# Patient Record
Sex: FEMALE | Race: WHITE | Employment: FULL TIME | URBAN - METROPOLITAN AREA
[De-identification: names, ages, dates, MRNs, and addresses within clinical notes are randomized per-mention and may not be internally consistent; named-entity substitution may affect disease eponyms.]

---

## 2018-04-16 PROBLEM — D22.5 COMPOUND NEVUS OF BACK: Status: ACTIVE | Noted: 2018-04-16

## 2018-04-16 PROBLEM — D22.4: Status: ACTIVE | Noted: 2018-04-16

## 2018-04-16 PROBLEM — D22.70 MULTIPLE BENIGN NEVI OF UPPER AND LOWER EXTREMITIES, AND TRUNK: Status: ACTIVE | Noted: 2018-04-16

## 2018-04-16 PROBLEM — L70.0 ACNE VULGARIS: Status: ACTIVE | Noted: 2018-04-16

## 2018-04-16 PROBLEM — L72.3 SEBACEOUS CYST: Status: ACTIVE | Noted: 2018-04-16

## 2018-04-16 PROBLEM — D22.60 MULTIPLE BENIGN NEVI OF UPPER AND LOWER EXTREMITIES, AND TRUNK: Status: ACTIVE | Noted: 2018-04-16

## 2018-04-16 PROBLEM — D22.5 MULTIPLE BENIGN NEVI OF UPPER AND LOWER EXTREMITIES, AND TRUNK: Status: ACTIVE | Noted: 2018-04-16

## 2018-04-16 PROBLEM — L91.8 CUTANEOUS SKIN TAGS: Status: ACTIVE | Noted: 2018-04-16

## 2019-01-28 ENCOUNTER — OFFICE VISIT (OUTPATIENT)
Dept: PRIMARY CARE CLINIC | Age: 29
End: 2019-01-28
Payer: COMMERCIAL

## 2019-01-28 VITALS
HEART RATE: 86 BPM | SYSTOLIC BLOOD PRESSURE: 116 MMHG | OXYGEN SATURATION: 98 % | BODY MASS INDEX: 49.1 KG/M2 | WEIGHT: 283.8 LBS | DIASTOLIC BLOOD PRESSURE: 72 MMHG

## 2019-01-28 DIAGNOSIS — M67.442 GANGLION CYST OF TENDON SHEATH OF LEFT HAND: ICD-10-CM

## 2019-01-28 DIAGNOSIS — M77.8 LEFT WRIST TENDONITIS: Primary | ICD-10-CM

## 2019-01-28 PROCEDURE — 99213 OFFICE O/P EST LOW 20 MIN: CPT | Performed by: FAMILY MEDICINE

## 2019-01-28 RX ORDER — PHENTERMINE HYDROCHLORIDE 37.5 MG/1
37.5 TABLET ORAL
Qty: 30 TABLET | Refills: 0 | Status: SHIPPED | OUTPATIENT
Start: 2019-01-28 | End: 2019-02-12 | Stop reason: SINTOL

## 2019-02-12 ENCOUNTER — TELEPHONE (OUTPATIENT)
Dept: PRIMARY CARE CLINIC | Age: 29
End: 2019-02-12

## 2019-02-19 ENCOUNTER — OFFICE VISIT (OUTPATIENT)
Dept: PRIMARY CARE CLINIC | Age: 29
End: 2019-02-19
Payer: COMMERCIAL

## 2019-02-19 VITALS
OXYGEN SATURATION: 99 % | DIASTOLIC BLOOD PRESSURE: 78 MMHG | TEMPERATURE: 98.3 F | WEIGHT: 281.4 LBS | HEART RATE: 83 BPM | BODY MASS INDEX: 48.68 KG/M2 | SYSTOLIC BLOOD PRESSURE: 126 MMHG

## 2019-02-19 DIAGNOSIS — J98.9 RESPIRATORY ILLNESS: Primary | ICD-10-CM

## 2019-02-19 DIAGNOSIS — R05.9 COUGH: ICD-10-CM

## 2019-02-19 PROCEDURE — 99213 OFFICE O/P EST LOW 20 MIN: CPT | Performed by: PHYSICIAN ASSISTANT

## 2019-02-19 PROCEDURE — 1036F TOBACCO NON-USER: CPT | Performed by: PHYSICIAN ASSISTANT

## 2019-02-19 PROCEDURE — G8484 FLU IMMUNIZE NO ADMIN: HCPCS | Performed by: PHYSICIAN ASSISTANT

## 2019-02-19 PROCEDURE — G8427 DOCREV CUR MEDS BY ELIG CLIN: HCPCS | Performed by: PHYSICIAN ASSISTANT

## 2019-02-19 PROCEDURE — G8417 CALC BMI ABV UP PARAM F/U: HCPCS | Performed by: PHYSICIAN ASSISTANT

## 2019-02-19 RX ORDER — BENZONATATE 200 MG/1
200 CAPSULE ORAL 3 TIMES DAILY PRN
Qty: 30 CAPSULE | Refills: 0 | Status: SHIPPED | OUTPATIENT
Start: 2019-02-19 | End: 2019-02-25 | Stop reason: SDUPTHER

## 2019-02-19 ASSESSMENT — ENCOUNTER SYMPTOMS
WHEEZING: 0
SHORTNESS OF BREATH: 0
CHEST TIGHTNESS: 0
COUGH: 1

## 2019-02-19 ASSESSMENT — PATIENT HEALTH QUESTIONNAIRE - PHQ9
2. FEELING DOWN, DEPRESSED OR HOPELESS: 0
SUM OF ALL RESPONSES TO PHQ QUESTIONS 1-9: 0
SUM OF ALL RESPONSES TO PHQ QUESTIONS 1-9: 0
SUM OF ALL RESPONSES TO PHQ9 QUESTIONS 1 & 2: 0
1. LITTLE INTEREST OR PLEASURE IN DOING THINGS: 0

## 2019-02-24 ASSESSMENT — ENCOUNTER SYMPTOMS
DIARRHEA: 0
VOMITING: 1
SORE THROAT: 1
RHINORRHEA: 0

## 2019-02-25 ENCOUNTER — TELEPHONE (OUTPATIENT)
Dept: PRIMARY CARE CLINIC | Age: 29
End: 2019-02-25

## 2019-02-25 ENCOUNTER — OFFICE VISIT (OUTPATIENT)
Dept: PRIMARY CARE CLINIC | Age: 29
End: 2019-02-25
Payer: COMMERCIAL

## 2019-02-25 VITALS
HEART RATE: 84 BPM | SYSTOLIC BLOOD PRESSURE: 116 MMHG | OXYGEN SATURATION: 98 % | BODY MASS INDEX: 47.89 KG/M2 | WEIGHT: 276.8 LBS | DIASTOLIC BLOOD PRESSURE: 78 MMHG

## 2019-02-25 DIAGNOSIS — J34.89 RHINORRHEA: Primary | ICD-10-CM

## 2019-02-25 DIAGNOSIS — R05.9 COUGH: ICD-10-CM

## 2019-02-25 DIAGNOSIS — J98.9 RESPIRATORY ILLNESS: ICD-10-CM

## 2019-02-25 PROCEDURE — G8484 FLU IMMUNIZE NO ADMIN: HCPCS | Performed by: FAMILY MEDICINE

## 2019-02-25 PROCEDURE — G8417 CALC BMI ABV UP PARAM F/U: HCPCS | Performed by: FAMILY MEDICINE

## 2019-02-25 PROCEDURE — 99213 OFFICE O/P EST LOW 20 MIN: CPT | Performed by: FAMILY MEDICINE

## 2019-02-25 PROCEDURE — G8427 DOCREV CUR MEDS BY ELIG CLIN: HCPCS | Performed by: FAMILY MEDICINE

## 2019-02-25 PROCEDURE — 1036F TOBACCO NON-USER: CPT | Performed by: FAMILY MEDICINE

## 2019-02-25 RX ORDER — BENZONATATE 200 MG/1
200 CAPSULE ORAL 3 TIMES DAILY PRN
Qty: 30 CAPSULE | Refills: 0 | Status: SHIPPED | OUTPATIENT
Start: 2019-02-25 | End: 2021-06-04

## 2019-02-25 RX ORDER — FLUTICASONE PROPIONATE 50 MCG
2 SPRAY, SUSPENSION (ML) NASAL DAILY
Qty: 2 BOTTLE | Refills: 1 | Status: SHIPPED | OUTPATIENT
Start: 2019-02-25 | End: 2019-02-25 | Stop reason: SDUPTHER

## 2019-02-25 RX ORDER — SODIUM SULFACETAMIDE 98 MG/G
AEROSOL, FOAM TOPICAL
Refills: 4 | COMMUNITY
Start: 2019-02-22 | End: 2019-02-25

## 2019-02-25 RX ORDER — FLUTICASONE PROPIONATE 50 MCG
2 SPRAY, SUSPENSION (ML) NASAL DAILY
Qty: 2 BOTTLE | Refills: 1 | Status: SHIPPED | OUTPATIENT
Start: 2019-02-25 | End: 2021-06-04

## 2019-02-25 ASSESSMENT — ENCOUNTER SYMPTOMS: COUGH: 1

## 2021-06-04 ENCOUNTER — HOSPITAL ENCOUNTER (EMERGENCY)
Age: 31
Discharge: HOME | End: 2021-06-04
Attending: EMERGENCY MEDICINE
Payer: COMMERCIAL

## 2021-06-04 VITALS
HEIGHT: 64 IN | TEMPERATURE: 98.5 F | SYSTOLIC BLOOD PRESSURE: 142 MMHG | OXYGEN SATURATION: 100 % | HEART RATE: 99 BPM | RESPIRATION RATE: 12 BRPM | WEIGHT: 293 LBS | DIASTOLIC BLOOD PRESSURE: 87 MMHG | BODY MASS INDEX: 50.02 KG/M2

## 2021-06-04 DIAGNOSIS — R51.9 ACUTE NONINTRACTABLE HEADACHE, UNSPECIFIED HEADACHE TYPE: Primary | ICD-10-CM

## 2021-06-04 DIAGNOSIS — M62.838 SPASM OF CERVICAL PARASPINOUS MUSCLE: ICD-10-CM

## 2021-06-04 LAB — HCG(URINE) PREGNANCY TEST: NEGATIVE

## 2021-06-04 PROCEDURE — 6360000002 HC RX W HCPCS: Performed by: PHYSICIAN ASSISTANT

## 2021-06-04 PROCEDURE — 99283 EMERGENCY DEPT VISIT LOW MDM: CPT

## 2021-06-04 PROCEDURE — 96372 THER/PROPH/DIAG INJ SC/IM: CPT

## 2021-06-04 PROCEDURE — 6370000000 HC RX 637 (ALT 250 FOR IP): Performed by: PHYSICIAN ASSISTANT

## 2021-06-04 PROCEDURE — 81025 URINE PREGNANCY TEST: CPT

## 2021-06-04 RX ORDER — CYCLOBENZAPRINE HCL 10 MG
10 TABLET ORAL 3 TIMES DAILY PRN
Qty: 21 TABLET | Refills: 0 | Status: SHIPPED | OUTPATIENT
Start: 2021-06-04 | End: 2021-06-14

## 2021-06-04 RX ORDER — KETOROLAC TROMETHAMINE 30 MG/ML
30 INJECTION, SOLUTION INTRAMUSCULAR; INTRAVENOUS ONCE
Status: COMPLETED | OUTPATIENT
Start: 2021-06-04 | End: 2021-06-04

## 2021-06-04 RX ORDER — ONDANSETRON 4 MG/1
4 TABLET, ORALLY DISINTEGRATING ORAL ONCE
Status: COMPLETED | OUTPATIENT
Start: 2021-06-04 | End: 2021-06-04

## 2021-06-04 RX ORDER — ORPHENADRINE CITRATE 30 MG/ML
60 INJECTION INTRAMUSCULAR; INTRAVENOUS ONCE
Status: COMPLETED | OUTPATIENT
Start: 2021-06-04 | End: 2021-06-04

## 2021-06-04 RX ORDER — PREDNISONE 20 MG/1
60 TABLET ORAL ONCE
Status: COMPLETED | OUTPATIENT
Start: 2021-06-04 | End: 2021-06-04

## 2021-06-04 RX ADMIN — ORPHENADRINE CITRATE 60 MG: 60 INJECTION INTRAMUSCULAR; INTRAVENOUS at 17:18

## 2021-06-04 RX ADMIN — ONDANSETRON 4 MG: 4 TABLET, ORALLY DISINTEGRATING ORAL at 17:12

## 2021-06-04 RX ADMIN — PREDNISONE 60 MG: 20 TABLET ORAL at 17:20

## 2021-06-04 RX ADMIN — KETOROLAC TROMETHAMINE 30 MG: 30 INJECTION, SOLUTION INTRAMUSCULAR; INTRAVENOUS at 17:17

## 2021-06-04 ASSESSMENT — PAIN SCALES - GENERAL
PAINLEVEL_OUTOF10: 4
PAINLEVEL_OUTOF10: 5
PAINLEVEL_OUTOF10: 5

## 2021-06-04 NOTE — ED NOTES
[employer] \"[employer]\" called, states she is o/c for \"Cecilia\" and will be in.      Carlos Vora RN  06/04/21 [id number]

## 2021-06-04 NOTE — ED PROVIDER NOTES
[address] [facility]  [address] [facility] [address] [facility] [address]  Phone: [phone]  Fax: [phone]        Pt Name: Ramonita Mi  MRN: [id number]  [place] 1990  Date of evaluation: 6/4/21    [address] [place] [address]       Chief Complaint   Patient presents with    Headache    Neck Pain       HISTORY OF PRESENT ILLNESS (Location/Symptom, Timing/Onset, Context/Setting, Quality, Duration, Modifying Factors, Severity)      Ramonita Mi is a 35 y.o. female with no pertinent PMH who presents to the ED via private auto with a headache. Patient reports that approximately 1 week ago she awoke with a \"crick in my neck\" on the right side that later developed into a generalized tightness feeling. She thought she had just pulled a muscle, however, the pain was not improving and the following day she developed a diffuse occipital headache that now radiates up over her scalp and into her forehead. She reports that the pain does wax and wane in severity, but never goes completely away. She states that she feels like her head and neck are \"tight all over. \" Denies thunderclap development. Notes that this is not the worst headache of her life as she had a really bad headache a week after the Covid vaccine was there for a day and then resolved and never returned. Denies any head trauma or injury to the head. She has exacerbation of the pain with flexing her head all the way down and raising her eyebrows. She has taken Tylenol, ibuprofen, aspirin, and drink caffeine with minimal improvement of her headache. Denies use of blood thinners. Denies history of malignancy. Denies personal or family history of aneurysm. Patient does mention that they recently got a new bed and pillows approximately a month ago. She does note that it is much softer than their old bed. Patient notes that she is a belly sleeper to and so feels like maybe she is sleeping in a fairly extended position, but is unsure what she does overnight. She also mentions that over the winter she gained about 30 pounds due to a job change and her  notes that she snores more frequently since then. She has never had a sleep evaluation. Denies fever, chills, recent illness, aura, vision changes, photophobia, phonophobia, cough, congestion, facial weakness, nausea, vomiting, diarrhea, abdominal pain, syncope, extremity weakness, numbness, paresthesias, difficulty speaking, or any other complaints at this time. PAST MEDICAL / SURGICAL / SOCIAL / FAMILY HISTORY     PMH:  has a past medical history of H/O chronic ear infection. Surgical History:  has a past surgical history that includes Tonsillectomy and adenoidectomy and Tympanostomy tube placement. Social History:  reports that she has never smoked. She has never used smokeless tobacco. She reports current alcohol use. She reports that she does not use drugs. Family History: She indicated that her mother is . She indicated that the status of her father is unknown. She indicated that the status of her maternal grandmother is unknown. She indicated that her maternal grandfather is . She indicated that the status of her paternal grandfather is unknown.   family history includes Cancer in her maternal grandfather, mother, and paternal grandfather; Diabetes in her maternal grandmother; Heart Disease in her father. Psychiatric History: None    Allergies: Latex and Adipex-p [phentermine hcl]    Home Medications:   Prior to Admission medications    Medication Sig Start Date End Date Taking?  Authorizing Provider   cyclobenzaprine (FLEXERIL) 10 MG tablet Take 1 tablet by mouth 3 times daily as needed for Muscle spasms 21 Yes Mary Kate Morales PA-C   Clindamycin Phosphate 1 % FOAM Apply topically to face chest and back once to twice daily 18  Yes Yair Schneider PA-C   etonogestrel (NEXPLANON) 68 MG implant 68 mg by Subdermal route once   Yes Historical Provider, MD   Multiple Vitamins-Minerals (THERAPEUTIC MULTIVITAMIN-MINERALS) tablet Take 1 tablet by mouth daily   Yes Historical Provider, MD       REVIEW OF SYSTEMS  (2-9 systems for level 4, 10 ormore for level 5)      Review of Systems    Constitutional: See HPI. Eyes: See HPI. HENT: See HPI. Respiratory: Denies shortness of breath. Cardiovascular: Denies chest pain. GI: See HPI. Musculoskeletal: Denies recent trauma. Skin: Denies new rashes or wounds. Neurologic:  See HPI. Heme: Denies bleeding disorders. All other systems negative except as marked. PHYSICAL EXAM  (up to 7 for level 4, 8 or more for level 5)      INITIAL VITALS:  height is 5' 4\" (1.626 m) and weight is 133.8 kg (295 lb). Her oral temperature is 98.5 °F (36.9 °C). Her blood pressure is 142/87 (abnormal) and her pulse is 99. Her respiration is 12 and oxygen saturation is 100%. Vital signs reviewed. Physical Exam    General:  Alert, cooperative, well-groomed, well-nourished, appears stated age, and is in no acute distress. Head:  Normocephalic, atraumatic, and without obvious abnormality. There is mild tenderness to palpation of the occiput extending parietally and to the frontal region. All diffuse and no localized tenderness. No overlying skin changes. Eyes:  Sclerae/conjunctivae clear without injection, pallor, or icterus. Corneas clear without opacities. PERRL EOM's intact. No nystagmus. ENT: Ears and nose are all without obvious masses lesion or deformity. Lips and buccal mucosa are pink and moist without lesions. Good dentition. Gingivae is pink and without lesions. Tongue and uvula midline. Symmetric elevation of soft palate upon phonation. No hoarseness or muffled voice. Oropharynx is clear, without erythema. Tonsils are symmetrical, without enlargement or erythema, bilaterally. No exudates or drainage. Neck: Supple and symmetrical. Trachea midline. No adenopathy. No jugular venous distention.   There is diffuse tenderness to palpation along the bilateral lateral cervical regions, right >> left, extending into the bilateral shoulders/trapezii. There is no midline tenderness of the neck. No overlying skin changes. There is full ROM of the neck without meningeal signs. Lungs:   No respiratory distress. Clear to auscultation bilaterally. No wheezes, rhonchi, or rales. Heart:  Regular rate. Regular rhythm. No murmurs, rubs, or gallops. Abdomen:   Normoactive bowel sounds. Soft, nontender, nondistended without guarding or rebound. No palpable masses. No CVA tenderness. Extremities: Warm and dry without erythema or edema. Skin: Soft, good turgor, and well-hydrated. No obvious rashes or lesions. Neurologic: GCS is 15 and no focal deficits are appreciated. Normal and symmetric muscle bulk and tone throughout. Strength is 5/5 throughout. Sensation intact to light touch throughout. Gait with normal base. Normal heel, toe and tandem gait. Good speed, accuracy and rhythm of KEEGAN, FTN, and HTS. No pronator drift. Normal Romberg test. DTRs 2+. CN II-XII grossly intact. Psychiatric: Normal mood and affect. Normal behavior. Coherent thought process. DIFFERENTIAL DIAGNOSIS / MDM     The patient presents to the Emergency Department with a benign-appearing gradual-onset headache as described above with associated neck \"tightness. \"  No meningeal signs. This is not the worst headache of her life. Vital signs are stable with slightly elevated heart rate and blood pressure. The neurologic examination is normal. My suspicion for serious pathology is low given a lack of significant risk factors and reassuring history and physical examination. Patient I had an extensive discussion regarding the risks and benefits of CT scan and she deferred at this time. Presentation is more suspicious for musculoskeletal/tension headache versus significant concerning etiologies, though these have not been ruled out I do lower suspicion for this.   Will medicate the patient after pregnancy is negative and reassess. PLAN (LABS / IMAGING / EKG):  Orders Placed This Encounter   Procedures    PREGNANCY, URINE       MEDICATIONS ORDERED:  Orders Placed This Encounter   Medications    ondansetron (ZOFRAN-ODT) disintegrating tablet 4 mg    orphenadrine (NORFLEX) injection 60 mg    ketorolac (TORADOL) injection 30 mg    predniSONE (DELTASONE) tablet 60 mg    cyclobenzaprine (FLEXERIL) 10 MG tablet     Sig: Take 1 tablet by mouth 3 times daily as needed for Muscle spasms     Dispense:  21 tablet     Refill:  0       Controlled Substances Monitoring:     DIAGNOSTIC RESULTS     LABS:  Results for orders placed or performed during the hospital encounter of 06/04/21   PREGNANCY, URINE   Result Value Ref Range    HCG(Urine) Pregnancy Test NEGATIVE NEGATIVE       EMERGENCY DEPARTMENT COURSE           Vitals:    Vitals:    06/04/21 1633   BP: (!) 142/87   Pulse: 99   Resp: 12   Temp: 98.5 °F (36.9 °C)   TempSrc: Oral   SpO2: 100%   Weight: 133.8 kg (295 lb)   Height: 5' 4\" (1.626 m)     -------------------------  BP: (!) 142/87, Temp: 98.5 °F (36.9 °C), Pulse: 99, Resp: 12      RE-EVALUATION:  On re-evaluation, the patient has improved following the above-stated treatment. Will Rx with home medications. The patient and I have discussed the diagnosis and risks, and we agree with discharging home to follow-up with their primary doctor. The patient appears stable for discharge and has been instructed to return immediately for new concerning symptoms, if the symptoms worsen in any way, or in 8-12 hours if not improved for re-evaluation. We have discussed the symptoms which are most concerning (e.g., significant worsening of the headache or the development of confusion, fever, vision changes, weakness, numbness, difficulty with speech or walking) that necessitate immediate return.      The patient understands that at this time there is no evidence for a more malignant underlying process, but the patient also understands that early in the process of an illness or injury, an emergency department workup can be falsely reassuring. Routine discharge counseling was given, and the patient understands that worsening, changing or persistent symptoms should prompt an immediate call or follow up with their primary physician or return to the emergency department. The importance of appropriate follow up was also discussed. I have reviewed the disposition diagnosis with the patient and or their family/guardian. I have answered their questions and given discharge instructions. They voiced understanding of these instructions and did not have any further questions or complaints. This patient was seen by the attending physician and they agreed with the assessment and plan. CONSULTS:  None    PROCEDURES:  None    FINAL IMPRESSION      1. Acute nonintractable headache, unspecified headache type    2. Spasm of cervical paraspinous muscle          DISPOSITION / PLAN     CONDITION ON DISPOSITION:   Good / Stable for discharge. PATIENT REFERRED TO:  King Cuba MD  [address]   [address] [facility] [address]  [phone]    Call in 3 days  To schedule an appointment for follow-up      DISCHARGE MEDICATIONS:  Discharge Medication List as of 6/4/2021  6:22 PM      START taking these medications    Details   cyclobenzaprine (FLEXERIL) 10 MG tablet Take 1 tablet by mouth 3 times daily as needed for Muscle spasms, Disp-21 tablet, R-0Normal             Dennys Tee PA-C   Emergency Medicine Physician Assistant    (Please note that portions of this note were completed with a voice recognition program.  Efforts were made to edit the dictations but occasionally words aremis-transcribed.)        Dennys Tee PA-C  06/06/21 2030

## 2021-06-04 NOTE — ED PROVIDER NOTES
was gradual onset. Headache was not thunderclap in nature, or worst of life. However, she does not typically get headaches. No fever or chills. No neck pain or stiffness. No personal or family history of aneurysms in the brain. No focal deficits on exam. Patient is improved after medications. She was offered a CT of the head for further evaluation, but declined. Patient does have a primary care provider, and can get into the office within the next few days.       (Please note that portions of this note were completed with a voice recognition program.  Efforts were made to edit the dictations but occasionally words are mis-transcribed.)    Ann Hernández MD  Attending Emergency Medicine Physician        Ann Hernández MD  06/04/21 0672

## 2021-06-14 ENCOUNTER — OFFICE VISIT (OUTPATIENT)
Dept: PRIMARY CARE CLINIC | Age: 31
End: 2021-06-14
Payer: COMMERCIAL

## 2021-06-14 VITALS
OXYGEN SATURATION: 97 % | HEART RATE: 103 BPM | DIASTOLIC BLOOD PRESSURE: 74 MMHG | SYSTOLIC BLOOD PRESSURE: 124 MMHG | TEMPERATURE: 98.2 F | HEIGHT: 64 IN | BODY MASS INDEX: 50.02 KG/M2 | WEIGHT: 293 LBS

## 2021-06-14 DIAGNOSIS — G44.209 ACUTE NON INTRACTABLE TENSION-TYPE HEADACHE: Primary | ICD-10-CM

## 2021-06-14 DIAGNOSIS — R42 VERTIGO: ICD-10-CM

## 2021-06-14 PROCEDURE — 1036F TOBACCO NON-USER: CPT | Performed by: FAMILY MEDICINE

## 2021-06-14 PROCEDURE — G8427 DOCREV CUR MEDS BY ELIG CLIN: HCPCS | Performed by: FAMILY MEDICINE

## 2021-06-14 PROCEDURE — G8417 CALC BMI ABV UP PARAM F/U: HCPCS | Performed by: FAMILY MEDICINE

## 2021-06-14 PROCEDURE — 99214 OFFICE O/P EST MOD 30 MIN: CPT | Performed by: FAMILY MEDICINE

## 2021-06-14 SDOH — ECONOMIC STABILITY: FOOD INSECURITY: WITHIN THE PAST 12 MONTHS, THE FOOD YOU BOUGHT JUST DIDN'T LAST AND YOU DIDN'T HAVE MONEY TO GET MORE.: NEVER TRUE

## 2021-06-14 SDOH — ECONOMIC STABILITY: FOOD INSECURITY: WITHIN THE PAST 12 MONTHS, YOU WORRIED THAT YOUR FOOD WOULD RUN OUT BEFORE YOU GOT MONEY TO BUY MORE.: NEVER TRUE

## 2021-06-14 ASSESSMENT — PATIENT HEALTH QUESTIONNAIRE - PHQ9
SUM OF ALL RESPONSES TO PHQ QUESTIONS 1-9: 0
SUM OF ALL RESPONSES TO PHQ QUESTIONS 1-9: 0
SUM OF ALL RESPONSES TO PHQ9 QUESTIONS 1 & 2: 0
SUM OF ALL RESPONSES TO PHQ QUESTIONS 1-9: 0
2. FEELING DOWN, DEPRESSED OR HOPELESS: 0
1. LITTLE INTEREST OR PLEASURE IN DOING THINGS: 0

## 2021-06-14 ASSESSMENT — SOCIAL DETERMINANTS OF HEALTH (SDOH): HOW HARD IS IT FOR YOU TO PAY FOR THE VERY BASICS LIKE FOOD, HOUSING, MEDICAL CARE, AND HEATING?: NOT VERY HARD

## 2021-06-14 NOTE — PROGRESS NOTES
[address] [place] [address] PRIMARY CARE  [address]  [facility] [address]  Dept: [phone]    Caitie Everett is a 35 y.o. female Established patient, who presents today for her medical conditions/complaintsas noted below. Chief Complaint   Patient presents with    Headache     ED vitist 6/4/21 tnesion        HPI:     HPI  Headaches better now. Stopped taking flexeril last week. Sleeping better. H/a started 2 weeks ago, right neck stiffness and pain behind the ear, got vertiog. Then got pressure around her head and into the eyes. Had severe pressure pain in the head and moving eyes felt worse. Usually only gets headaches after dehydration etc.     Got a headache a week after her first covid vaccine and dizziness and eyes hurt    No fam hx of significant migraines: 1/2 sister gets occasional migraines. Does computer work all day. Got new glasses last week.      Reviewed prior notes None  Reviewed previous Labs, Imaging and Hospital Records    LDL Calculated (mg/dL)   Date Value   03/15/2018 92       (goal LDL is <100)   No results found for: AST, ALT, BUN, LABA1C, TSH  BP Readings from Last 3 Encounters:   06/14/21 124/74   06/04/21 (!) 142/87   02/25/19 116/78          (goal 120/80)    Past Medical History:   Diagnosis Date    H/O chronic ear infection       Past Surgical History:   Procedure Laterality Date    TONSILLECTOMY AND ADENOIDECTOMY      TYMPANOSTOMY TUBE PLACEMENT      multiple times       Family History   Problem Relation Age of Onset    Cancer Mother         lung-smoker    Heart Disease Father     Diabetes Maternal Grandmother     Cancer Maternal Grandfather     Cancer Paternal Grandfather        Social History     Tobacco Use    Smoking status: Never Smoker    Smokeless tobacco: Never Used   Substance Use Topics    Alcohol use: Yes     Comment: rarely      Current Outpatient Medications   Medication Sig Dispense Refill    cyclobenzaprine (FLEXERIL) 10 MG tablet Take 1 tablet by mouth 3 times daily as needed for Muscle spasms 21 tablet 0    Clindamycin Phosphate 1 % FOAM Apply topically to face chest and back once to twice daily 100 g 2    etonogestrel (NEXPLANON) 68 MG implant 68 mg by Subdermal route once      Multiple Vitamins-Minerals (THERAPEUTIC MULTIVITAMIN-MINERALS) tablet Take 1 tablet by mouth daily       No current facility-administered medications for this visit. Allergies   Allergen Reactions    Latex Rash    Adipex-P [Phentermine Hcl] Palpitations     Dry mouth, palpitations, can't sleep       Health Maintenance   Topic Date Due    Hepatitis C screen  Never done    Varicella vaccine (1 of 2 - 2-dose childhood series) Never done    COVID-19 Vaccine (1) Never done    HIV screen  Never done    DTaP/Tdap/Td vaccine (1 - Tdap) Never done    Cervical cancer screen  11/30/2018    Flu vaccine (Season Ended) 09/01/2021    Hepatitis A vaccine  Aged Out    Hepatitis B vaccine  Aged Out    Hib vaccine  Aged Out    Meningococcal (ACWY) vaccine  Aged Out    Pneumococcal 0-64 years Vaccine  Aged Out       Subjective:      Review of Systems   Constitutional: Negative. Neurological: Positive for headaches.   had vertigo with the headache  Sees dermatologist.   Had multiple ear surgeries as a child. Gets dizziness with fast motion: that is better    Objective:     /74   Pulse 103   Temp 98.2 °F (36.8 °C)   Ht 5' 4\" (1.626 m)   Wt (!) 315 lb 9.6 oz (143.2 kg)   SpO2 97%   Breastfeeding No   BMI 54.17 kg/m²   Physical Exam  Vitals and nursing note reviewed. Constitutional:       General: She is not in acute distress. Appearance: She is well-developed. She is not ill-appearing. HENT:      Head: Normocephalic and atraumatic.       Right Ear: Ear canal and external ear normal.      Left Ear: Ear canal and external ear normal.      Ears:      Comments: TM's dull, pink slightly  Scar tissue on left  Eyes: General: No scleral icterus. Right eye: No discharge. Left eye: No discharge. Conjunctiva/sclera: Conjunctivae normal.      Pupils: Pupils are equal, round, and reactive to light. Neck:      Thyroid: No thyromegaly. Trachea: No tracheal deviation. Comments: Neck range of motion is within acceptable range. Her posterior scalp muscles are tight. Cervical paraspinals and upper trapezius muscles are tight but not tender. TMJs are not tender to palpation, no crepitations  Temporal arteries not swollen or tender. Cardiovascular:      Rate and Rhythm: Normal rate and regular rhythm. Heart sounds: Normal heart sounds. Pulmonary:      Effort: Pulmonary effort is normal. No respiratory distress. Breath sounds: Normal breath sounds. No wheezing. Lymphadenopathy:      Cervical: No cervical adenopathy. Skin:     General: Skin is warm. Findings: No rash. Neurological:      Mental Status: She is alert and oriented to person, place, and time. Deep Tendon Reflexes:      Reflex Scores:       Bicep reflexes are 0 on the right side and 1+ on the left side. Brachioradialis reflexes are 0 on the right side and 0 on the left side. Comments:  strength 5/5 bilaterally   Psychiatric:         Mood and Affect: Mood normal.         Behavior: Behavior normal.         Thought Content: Thought content normal.         Assessment:       Diagnosis Orders   1. Acute non intractable tension-type headache     2. Vertigo          Plan:      No follow-ups on file. Do stretches and if not better see PT  May need to see ENT for vertigo if it recurs  Neck and scapular exercises. Watch posture when working. No orders of the defined types were placed in this encounter. No orders of the defined types were placed in this encounter. Patient given educationalmaterials - see patient instructions. Discussed use, benefit, and side effectsof prescribed medications.   All patient questions answered. Pt voiced understanding. Reviewed health maintenance. Instructed to continue current medications, diet and exercise. Patient agreed with treatment plan. Follow up as directed.      Electronicallysigned by Consuelo Rinne, MD on 6/14/2021 at 2:32 PM

## 2021-06-14 NOTE — PATIENT INSTRUCTIONS
Patient Education        Neck Spasm: Exercises  Introduction  Here are some examples of exercises for you to try. The exercises may be suggested for a condition or for rehabilitation. Start each exercise slowly. Ease off the exercises if you start to have pain. You will be told when to start these exercises and which ones will work best for you. How to do the exercises  Levator scapula stretch   1. Sit in a firm chair, or stand up straight. 2. Gently tilt your head toward your left shoulder. 3. Turn your head to look down into your armpit, bending your head slightly forward. Let the weight of your head stretch your neck muscles. 4. Hold for 15 to 30 seconds. 5. Return to your starting position. 6. Follow the same instructions above, but tilt your head toward your right shoulder. 7. Repeat 2 to 4 times toward each shoulder. Upper trapezius stretch   1. Sit in a firm chair, or stand up straight. 2. This stretch works best if you keep your shoulder down as you lean away from it. To help you remember to do this, start by relaxing your shoulders and lightly holding on to your thighs or your chair. 3. Tilt your head toward your shoulder and hold for 15 to 30 seconds. Let the weight of your head stretch your muscles. 4. If you would like a little added stretch, place your arm behind your back. Use the arm opposite of the direction you are tilting your head. For example, if you are tilting your head to the left, place your right arm behind your back. 5. Repeat 2 to 4 times toward each shoulder. Neck rotation   1. Sit in a firm chair, or stand up straight. 2. Keeping your chin level, turn your head to the right, and hold for 15 to 30 seconds. 3. Turn your head to the left, and hold for 15 to 30 seconds. 4. Repeat 2 to 4 times to each side. Chin tuck   1. Lie on the floor with a rolled-up towel under your neck. Your head should be touching the floor.   2. Slowly bring your chin toward the front of your neck. 3. Hold for a count of 6, and then relax for up to 10 seconds. 4. Repeat 8 to 12 times. Forward neck flexion   1. Sit in a firm chair, or stand up straight. 2. Bend your head forward. 3. Hold for 15 to 30 seconds, then return to your starting position. 4. Repeat 2 to 4 times. Follow-up care is a key part of your treatment and safety. Be sure to make and go to all appointments, and call your doctor if you are having problems. It's also a good idea to know your test results and keep a list of the medicines you take. Where can you learn more? Go to https://Siverge NetworkspeBlue Interactive Group.Voya.ge. org and sign in to your [employer] account. Enter P962 in the Starbates box to learn more about \"Neck Spasm: Exercises. \"     If you do not have an account, please click on the \"Sign Up Now\" link. Current as of: November 16, 2020               Content Version: 12.8  © 2006-2021 [employer]. Care instructions adapted under license by [facility] ([facility]). If you have questions about a medical condition or this instruction, always ask your healthcare professional. [place] [address] any warranty or liability for your use of this information. Patient Education        Shoulder Blade: Exercises  Introduction  Here are some examples of exercises for you to try. The exercises may be suggested for a condition or for rehabilitation. Start each exercise slowly. Ease off the exercises if you start to have pain. You will be told when to start these exercises and which ones will work best for you. How to do the exercises  Shoulder roll   8. Stand tall with your chin slightly tucked. Imagine that a string at the top of your head is pulling you straight up. 9. Keep your arms relaxed. All motion will be in your shoulders. 10. Shrug your shoulders up toward your ears, then up and back.  [place] your shoulders down and back, like you're sliding your hands down into your back pants straight in front of you. Adjust your distance to create slight tension in the tubing or band. 4. Slightly tuck your chin. Relax your shoulders. 5. Without shrugging your shoulders, pull straight back. Your elbows will pass alongside your waist.    Pull-downs   1. Welch your elastic tubing or band in the top of a closed door. Take one end in each hand. 2. Either sit or stand, depending on what is more comfortable. If you feel unsteady, sit on a chair. 3. Start with your arms up and comfortably apart, elbows straight. There should be a slight tension in the tubing or band. 4. Slightly tuck your chin, and look straight ahead. 5. Keeping your back straight, slowly pull down and back, bending your elbows. 6. Stop where your hands are level with your chin, in a \"goalpost\" position. 7. Repeat 8 to 12 times. Chest T stretch   1. Lie on your back. Raise your knees so they are bent. Plant your feet on the floor, hip-width apart. 2. Tuck your chin, and relax your shoulders. 3. Reach your arms straight out to the sides. If you don't feel a mild stretch in your shoulders and across your chest, use a foam roll or a tightly rolled blanket under your spine, from your tailbone to your head. 4. Relax in this position for at least 15 to 30 seconds while you breathe normally. Repeat 2 to 4 times. 5. As you get used to this stretch, keep adding a little more time until you are able relax in this position for 2 or 3 minutes. When you can relax for at least 2 minutes, you only need to do the exercise 1 time per session. Chest goalpost stretch   1. Lie on your back. Raise your knees so they are bent. Plant your feet on the floor, hip-width apart. 2. Tuck your chin, and relax your shoulders. 3. Reach your arms straight out to the sides. 4. Bend your arms at the elbows, with your hands pointed toward the top of your head. Your arms should make an L on either side of your head. Your palms should be facing up.   5. If you don't feel a mild stretch in your shoulders and across your chest, use a foam roll or tightly rolled blanket under your spine, from your tailbone to your head. 6. Relax in this position for at least 15 to 30 seconds while you breathe normally. Repeat 2 to 4 times. 7. Each day you do this exercise, add a little more time until you can relax in this position for 2 or 3 minutes. When you can relax for at least 2 minutes, you only need to do the exercise 1 time per session. Follow-up care is a key part of your treatment and safety. Be sure to make and go to all appointments, and call your doctor if you are having problems. It's also a good idea to know your test results and keep a list of the medicines you take. Where can you learn more? Go to https://Think Silicondeepaeb.ConnectNigeria.com. org and sign in to your [employer] account. Enter [phone] in the Jetpac box to learn more about \"Shoulder Blade: Exercises. \"     If you do not have an account, please click on the \"Sign Up Now\" link. Current as of: November 16, 2020               Content Version: 12.8  © 8349-8288 Healthwise, Incorporated. Care instructions adapted under license by [facility] ([facility]). If you have questions about a medical condition or this instruction, always ask your healthcare professional. [address] any warranty or liability for your use of this information.

## 2022-05-24 ENCOUNTER — OFFICE VISIT (OUTPATIENT)
Dept: PRIMARY CARE CLINIC | Age: 32
End: 2022-05-24
Payer: COMMERCIAL

## 2022-05-24 VITALS
OXYGEN SATURATION: 98 % | WEIGHT: 293 LBS | HEIGHT: 64 IN | BODY MASS INDEX: 50.02 KG/M2 | SYSTOLIC BLOOD PRESSURE: 138 MMHG | HEART RATE: 105 BPM | DIASTOLIC BLOOD PRESSURE: 86 MMHG

## 2022-05-24 DIAGNOSIS — M65.4 DE QUERVAIN'S DISEASE (RADIAL STYLOID TENOSYNOVITIS): Primary | ICD-10-CM

## 2022-05-24 PROCEDURE — 99212 OFFICE O/P EST SF 10 MIN: CPT | Performed by: FAMILY MEDICINE

## 2022-05-24 PROCEDURE — 1036F TOBACCO NON-USER: CPT | Performed by: FAMILY MEDICINE

## 2022-05-24 PROCEDURE — G8417 CALC BMI ABV UP PARAM F/U: HCPCS | Performed by: FAMILY MEDICINE

## 2022-05-24 PROCEDURE — G8427 DOCREV CUR MEDS BY ELIG CLIN: HCPCS | Performed by: FAMILY MEDICINE

## 2022-05-24 RX ORDER — MINOCYCLINE HYDROCHLORIDE 100 MG/1
CAPSULE ORAL
COMMUNITY
Start: 2022-01-26

## 2022-05-24 RX ORDER — NAPROXEN 500 MG/1
500 TABLET ORAL 2 TIMES DAILY WITH MEALS
Qty: 60 TABLET | Refills: 0 | Status: SHIPPED | OUTPATIENT
Start: 2022-05-24 | End: 2022-06-20

## 2022-05-24 ASSESSMENT — PATIENT HEALTH QUESTIONNAIRE - PHQ9
SUM OF ALL RESPONSES TO PHQ QUESTIONS 1-9: 0
SUM OF ALL RESPONSES TO PHQ QUESTIONS 1-9: 0
SUM OF ALL RESPONSES TO PHQ9 QUESTIONS 1 & 2: 0
SUM OF ALL RESPONSES TO PHQ QUESTIONS 1-9: 0
2. FEELING DOWN, DEPRESSED OR HOPELESS: 0
SUM OF ALL RESPONSES TO PHQ QUESTIONS 1-9: 0
1. LITTLE INTEREST OR PLEASURE IN DOING THINGS: 0

## 2022-05-24 NOTE — PATIENT INSTRUCTIONS
Patient Education        Stevphen Beam Disease: Exercises  Introduction  Here are some examples of exercises for you to try. The exercises may be suggested for a condition or for rehabilitation. Start each exercise slowly. Ease off the exercises if you start to have pain. You will be told when to start these exercises and which ones will work bestfor you. How to do the exercises  Thumb lifts    1. Place your hand on a flat surface, with your palm up. 2. Lift your thumb away from your palm to make a \"C\" shape. 3. Hold for about 6 seconds. 4. Repeat 8 to 12 times. Passive thumb MP flexion    1. Hold your hand in front of you, and turn your hand so your little finger faces down and your thumb faces up. (Your hand should be in the position used for shaking someone's hand.) You may also rest your hand on a flat surface. 2. Use the fingers on your other hand to bend your thumb down at the point where your thumb connects to your palm. 3. Hold for at least 15 to 30 seconds. 4. Repeat 2 to 4 times. Finkelstein stretch    1. Hold your arms out in front of you. (Your hand should be in the position used for shaking someone's hand.)  2. Bend your thumb toward your palm. 3. Use your other hand to gently stretch your thumb and wrist downward until you feel the stretch on the thumb side of your wrist.  4. Hold for at least 15 to 30 seconds. 5. Repeat 2 to 4 times. Resisted ulnar deviation    For this exercise, you will need elastic exercise material, such as surgicaltubing or Thera-Band. 1. Sit leaning forward with your legs slightly spread and your elbow on your thigh. 2. Grasp one end of the band with your palm down, and step on the other end with the foot opposite the hand holding the band. 3. Slowly bend your wrist sideways and away from your knee. 4. Repeat 8 to 12 times. Follow-up care is a key part of your treatment and safety.  Be sure to make and go to all appointments, and call your doctor if you are having problems. It's also a good idea to know your test results and keep alist of the medicines you take. Where can you learn more? Go to https://chpepiceweb.TotSpot. org and sign in to your [employer] account. Enter O588 in the [facility] box to learn more about \"De Quervain's Disease: Exercises. \"     If you do not have an account, please click on the \"Sign Up Now\" link. Current as of: July 1, 2021               Content Version: 13.2  © 6426-7937 [employer]. Care instructions adapted under license by [facility] ([facility]). If you have questions about a medical condition or this instruction, always ask your healthcare professional. [address] any warranty or liability for your use of this information. Patient Education        Alessia Rojas Tenosynovitis: Care Instructions  Your Care Instructions  Alessia Rojas (say \"prk-nxum-QFP\") tenosynovitis is a problem that makes the bottom of your thumb and the side of your wrist hurt. When you have de Quervain's, the ropey fiber (tendon) that helps move your thumb away from yourfingers becomes swollen. You may have pain when you move your wrist or pick things up. You may hear acreaking sound when you move your wrist or thumb. Symptoms often get better in a few weeks with home care. Your doctor may want you to start some gentle stretching exercises once your symptoms are gone. Sometimes treatment with an injection or surgery is needed. Follow-up care is a key part of your treatment and safety. Be sure to make and go to all appointments, and call your doctor if you are having problems. It's also a good idea to know your test results and keep alist of the medicines you take. How can you care for yourself at home?  Until your symptoms are better, stop the activities that caused the pain.  Avoid moving the hand and wrist that hurt.    Follow your doctor's directions for wearing a splint to keep your thumb and wrist from moving.  Try ice or heat. ? Put ice or a cold pack on your thumb and wrist for 10 to 20 minutes at a time. Put a thin cloth between the ice and your skin. ? You can use heat for 20 to 30 minutes, 2 or 3 times a day. Try using a heating pad, hot shower, or hot pack.  Ask your doctor if you can take an over-the-counter pain medicine, such as acetaminophen (Tylenol), ibuprofen (Advil, Motrin), or naproxen (Aleve). Be safe with medicines. Read and follow all instructions on the label. When should you call for help? Watch closely for changes in your health, and be sure to contact your doctor if:     You have new or worse pain.      You have new or worse numbness or tingling in your hand or fingers.      Your hand feels weaker.      You do not get better as expected. Where can you learn more? Go to https://InsideMaps.Roojoom. org and sign in to your Interview Rocket account. Enter A194 in the [facility] box to learn more about \"De Quervain's Tenosynovitis: Care Instructions. \"     If you do not have an account, please click on the \"Sign Up Now\" link. Current as of: July 1, 2021               Content Version: 13.2  © 7573-5610 Healthwise, Incorporated. Care instructions adapted under license by [facility] ([facility]). If you have questions about a medical condition or this instruction, always ask your healthcare professional. [place] [address] any warranty or liability for your use of this information.

## 2022-05-24 NOTE — PROGRESS NOTES
Lynder Soulier is a 28 y.o. femalewho presents today for her medical conditions/complaints as noted below. Chief Complaint   Patient presents with    Wrist Pain     Pt here today with LT wrist pain, numbness and tingling. x1-2 months    Weight Gain     Pt wants to talk about her weight pain. HPI:     HPI  Tightness in left wrist started in Mid march and numbness in thumb. No injury. Seen at [facility] in April: given brace and prednisone  Can't lift with left hand, grabbing hurts. increase in pain feels tingling to touch, hurts in radial wrist and forearm. Left handed. Writing and typing a lot     Tried tylenol for pain. not much help      Current Outpatient Medications   Medication Sig Dispense Refill    minocycline (MINOCIN;DYNACIN) 100 MG capsule       naproxen (NAPROSYN) 500 MG tablet Take 1 tablet by mouth 2 times daily (with meals) 60 tablet 0    diclofenac sodium (VOLTAREN) 1 % GEL Apply 2 g topically 4 times daily 100 g 1    etonogestrel (NEXPLANON) 68 MG implant 68 mg by Subdermal route once      Multiple Vitamins-Minerals (THERAPEUTIC MULTIVITAMIN-MINERALS) tablet Take 1 tablet by mouth daily       No current facility-administered medications for this visit. Allergies   Allergen Reactions    Latex Rash    Adipex-P [Phentermine Hcl] Palpitations     Dry mouth, palpitations, can't sleep       Subjective:     Review of Systems   Constitutional: Negative. Objective:     /86   Pulse 105   Ht 5' 4\" (1.626 m)   Wt (!) 325 lb 9.6 oz (147.7 kg)   SpO2 98%   BMI 55.89 kg/m²   Physical Exam  Vitals and nursing note reviewed. Constitutional:       Appearance: Normal appearance. She is not ill-appearing. HENT:      Head: Normocephalic and atraumatic. Musculoskeletal:      Comments: Left radial wrist swollen and tender in the 1st MC/Carpal joint and radial wrist  Negative Tinel's sign. Negative wrist flick sign.   Not tender in the rest of the hand or the fingers except for the thumb and the radial wrist.  No redness in the joint. Mild increase in warmth. She feels decreased sensation over the posterior first metacarpal carpal area. Pain with stretching the left thumb across the palm. Mid forearm anterior and posterior surfaces are not tender to palpation. Pronation and supination of the forearm and hand do not cause increased pain. Resisted motion of the forearm in pronation, supination do not cause increasing pain      Skin:     General: Skin is warm. Neurological:      Mental Status: She is alert and oriented to person, place, and time. Psychiatric:         Mood and Affect: Mood normal.         Behavior: Behavior normal.         Thought Content: Thought content normal.         Assessment:       Diagnosis Orders   1. De Quervain's disease (radial styloid tenosynovitis)  [facility]  [place]    naproxen (NAPROSYN) 500 MG tablet    diclofenac sodium (VOLTAREN) 1 % GEL    XR WRIST LEFT (MIN 3 VIEWS)        Plan:      No follow-ups on file. Check xray since pain has been going for 2 months  Cold packs after work   Use wrist splint at HS and only off and on during the day.    Try voltaren gel  See PT/OT for wrist.     Orders Placed This Encounter   Procedures    XR WRIST LEFT (MIN 3 VIEWS)     Standing Status:   Future     Standing Expiration Date:   5/24/2023   [facility]  [place]     Referral Priority:   Routine     Referral Type:   Eval and Treat     Referral Reason:   Specialty Services Required     Requested Specialty:   Physical Therapist     Number of Visits Requested:   1     Orders Placed This Encounter   Medications    naproxen (NAPROSYN) 500 MG tablet     Sig: Take 1 tablet by mouth 2 times daily (with meals)     Dispense:  60 tablet     Refill:  0    diclofenac sodium (VOLTAREN) 1 % GEL     Sig: Apply 2 g topically 4 times daily     Dispense:  100 g     Refill:  1      Reviewed medications and possibleside effects.        Electronically signed by Judson Ardon MD on 5/24/2022 at 10:53 AM

## 2022-05-25 ENCOUNTER — HOSPITAL ENCOUNTER (OUTPATIENT)
Age: 32
Discharge: HOME | End: 2022-05-27
Payer: COMMERCIAL

## 2022-05-25 ENCOUNTER — HOSPITAL ENCOUNTER (OUTPATIENT)
Dept: GENERAL RADIOLOGY | Age: 32
Discharge: HOME | End: 2022-05-27
Payer: COMMERCIAL

## 2022-05-25 DIAGNOSIS — M65.4 DE QUERVAIN'S DISEASE (RADIAL STYLOID TENOSYNOVITIS): ICD-10-CM

## 2022-05-25 PROCEDURE — 73110 X-RAY EXAM OF WRIST: CPT

## 2022-06-01 ENCOUNTER — HOSPITAL ENCOUNTER (OUTPATIENT)
Dept: PHYSICAL THERAPY | Facility: CLINIC | Age: 32
Setting detail: THERAPIES SERIES
Discharge: HOME | End: 2022-06-01
Payer: COMMERCIAL

## 2022-06-01 PROCEDURE — 97162 PT EVAL MOD COMPLEX 30 MIN: CPT

## 2022-06-01 PROCEDURE — 97033 APP MDLTY 1+IONTPHRSIS EA 15: CPT

## 2022-06-01 PROCEDURE — 97110 THERAPEUTIC EXERCISES: CPT

## 2022-06-01 NOTE — FLOWSHEET NOTE
[facility] [address] [place] [address], [facility].  Phone: [phone]  Fax:     [phone]    Physical Therapy Evaluation    Date:  2022  Patient: Nolan Chinchilla  : 1990  MRN: [id number]  Physician: [address]: [facility]              Eligibility Status:  Eligible     DOS:   # of visits allowed/remainin  Source: Website  Spoke To:  [email or web address]  Reference: [id number]  Auth: NPRe  Fabiano yr; visit limit  remain; $15 copay; CVV$4361-$2170. 07remain; OPP$6350-$6255.53remain; No Coinsurance  Medical Diagnosis: L DeQuervain's Tenosynovitis     Rehab Codes: M65.4  Onset Date: 3/22                                  Subjective:  Pt report pain, stiffness of L radial wrist, thumb region, episodes of swelling, loss of mobility. Pt states pain began 3/22 w/ gradual onset and no specific incident of injury. Pain worse w/ gripping activities. Pt given thumb spica splint, Prednisone w/ short term relief. Pt w/ previous hx of L wrist strain.     PMHx: [] Unremarkable [] Diabetes [] HTN  [] Pacemaker   [] MI/Heart Problems [] Cancer [] Arthritis [] Asthma                         [x] refer to full medical chart  In [facility]  [] Other:        Tests: [x] X-Ray: neg [] MRI:  [] Other:    Medications: [x] Refer to full medical record [] None [] Other:  Allergies:      [x] Refer to full medical record [] None [] Other:    Function:  Hand Dominance  [] Right  [x] Left  Working:  [x] Normal Duty  [] Light Duty  [] Off D/T Condition  [] Retired     [] Not Employed  []  Disability  [] Other:            Return to work:   Job/ADL Description: Pt employed as  at [employer], mostly computer, desk work    Pain:  [x] Yes  [] No Pain Rating: (0-10 scale) 4/10  Pain altered Tx:  [] Yes  [x] No  Action:    Symptoms:  [] Improving [] Worsening [x] Same    Objective:   L/R   ROM  ° A/P STRENGTH    Elbow Flex WNL WNL 4 4+    Ext WNL WNL 4 4+    Pron WNL WNL 4- 4+    Sup 80 WNL 4- 4+    Wrist flex 80 WNL 4 4+    ext 70 WNL 4 4+    Thumb opp 20 WNL 4- 4+    ext 30 WNL 4- 4+    abd  WNL WNL 4- 4+    add WNL WNL 4- 4+      OBSERVATION No Deficit Deficit Not Tested Comments   Palpation [] [x]     Sensation [x] []     Edema [x] []       FUNCTION Normal Difficult Unable   Gripping [] [x] []   Lifting [] [x] []   Twisting [] [x] []     ASSESSMENT   Pt limited by L radial wrist, thumb pain, ROM loss, thumb, wrist, elbow weakness w/ limitations in functional gripping activties d/t DeQuervain's Tenosynovitis. Will initiate wrist, thumb ROM, stretching w/ gradual progression into strengthening exercises as symptoms improve. PROBLEMS  Wrist, thumb pain  Wrist, thumb ROM loss  Wrist, thumb weakness  Wrist, thumb functional deficits    SHORT TERM GOALS ( 8 visits)  Wrist, thumb pain = 0  Wrist, thumb ROM = WNL  Wrist, thumbt strength = 4+/5  Wrist, thumb function: , lift, twist w/o pain    LONG TERM GOALS ( 12 visits)  Independent Home Exercise program  Return to normal activity    PATIENT GOAL  Full motion, no pain    Rehab Potential:  [x] Good  [] Fair  [] Poor   Suggested Professional Referral:  [x] No  [] Yes:  Barriers to Goal Achievement[de-identified]  [x] No  [] Yes:  Domestic Concerns:  [x] No  [] Yes:    Pt. Education:  [x] Plans/Goals, Risks/Benefits discussed  [x] Home exercise program  Method of Education: [x] Verbal  [x] Demo  [x] Written  Comprehension of Education:  [x] Verbalizes understanding. [x] Demonstrates understanding. [] Needs Review. [] Demonstrates/verbalizes understanding of HEP/Ed previously given.     Treatment Plan:  [x] Therapeutic Exercise   26937  [x] Iontophoresis: 4 mg/mL Dexamethasone Sodium Phosphate  mAmin  63725   [] Therapeutic Activity  61830 [] Vasopneumatic cold with compression  10072    [] Gait Training   82343 [] Ultrasound   72549   [] Neuromuscular Re-education  46623 [] Electrical Stimulation Unattended  15236   [x] Manual Therapy  41196 [] Electrical Stimulation Attended  [id number]   [x] Instruction in HEP  [] Lumbar/Cervical Traction  [id number]   [] Aquatic Therapy   [id number] [] Cold/hotpack    [] Massage   16732      [] Dry Needling, 1 or 2 muscles  45019   [] Biofeedback, first 15 minutes   03651  [] Biofeedback, additional 15 minutes   54487 [] Dry Needling, 3 or more muscles  05131     [x]  Medication allergies reviewed for use of    Dexamethasone Sodium Phosphate 4mg/ml     with iontophoresis treatments. Pt is not allergic. Frequency:  2 x/week for 12 visits    Todays Treatment:     6/1/2022 Visit #1    Exercise Reps/ Time Weight/ Level Comments   Radial wrist, thumb stretch 10x10s     Stretch wrist flex, ext 10x10s     Thumb ext stretch 10x10s     Thumb abd stretch 10x10s     Thumb opp stretch 10x10s         Specific Instructions for next treatment:    Treatment Charges: Mins Units   [x] Evaluation ----- 1   [x]  Modalities: IP, cold sleeve 15 1   [x]  Ther Exercise 20 1   []  Manual Therapy     []  Ther Activities     []  Aquatics     []  Other       Evaluation Complexity:  History   (Personal factors, co-morbidities)  [] 0 [x] 1-2 [] 3+   Exam   (limitations, restrictions) [] 1-2 [x] 3 [] 4+   Clinical presentation (progression) [] Stable [x] Evolving [] Unstable   Decision Making [] Low [x] Moderate [] High    [] Low Complexity [x] Moderate Complexity [] High Complexity     Time in: 1500     Time out: 1600    Electronically signed by: Dia Washington PT        Physician Signature:________________________________Date:__________________  By signing above, I have reviewed this plan of care and certify a need for medically   necessary rehabilitation services.      *PLEASE SIGN ABOVE AND FAX BACK ALL PAGES*

## 2022-06-08 ENCOUNTER — HOSPITAL ENCOUNTER (OUTPATIENT)
Dept: PHYSICAL THERAPY | Facility: CLINIC | Age: 32
Setting detail: THERAPIES SERIES
Discharge: HOME | End: 2022-06-08
Payer: COMMERCIAL

## 2022-06-08 PROCEDURE — 97110 THERAPEUTIC EXERCISES: CPT

## 2022-06-08 PROCEDURE — 97033 APP MDLTY 1+IONTPHRSIS EA 15: CPT

## 2022-06-08 NOTE — FLOWSHEET NOTE
[] [facility]) [facility] CENTER &  Therapy  [address]  [phone]  F: [phone] [] [address] [facility]   [address]  P: [phone]  F: [phone] [x] [facility] &  Therapy  [address]  P: [phone]  F: [phone] [] [address]  P: [phone]  F: [phone] [] [address]  [facility]   [address]   [place]: [phone]  F: [phone]      Physical Therapy Daily Treatment Note    Date:  2022  Patient Name:  Luis Manuel Shah    :  1990  MRN: [id number]  Physician: [address] [place] [address] 21: [facility]              Eligibility Status: [facility]  DOS:   # of visits allowed/remainin  Source: Website  Spoke To:  Rayku  Reference: [id number]  Auth: NPRe  Fabiano yr; visit limit  remain; $15 copay; HER$4604-$7743. 07remain; OPP$6350-$6255.53remain; No Coinsurance  Medical Diagnosis: L DeQuervain's Tenosynovitis                Rehab Codes: M65.4  Onset Date: 3/22   Visit# / total visits:      Cancels/No Shows: 0/0    Subjective: Pt states that her L wrist and thumb have been feeling much better since her initial visit. Has been stretching daily. Purchases the freeze sleeve that has been helping as well. Any flexion or opposition stretch can be a little uncomfortable. Pain:  [x] Yes  [] No Location: L wrist/hand  Pain Rating: (0-10 scale) 1/10  Pain altered Tx:  [x] No  [] Yes  Action:  Comments:    Objective:       Todays Treatment:  Treatment Location  Left      Right                          Position    [x]          []  [] Supine    [] Prone   [] Side lying  [] Sitting          Treatment Modality    Hot Pack:    [] Large   [] Medium    [] Cervical _____ min   2 Cold Pack   [x] Large   [] Medium    [] Cervical     ___15__ min - freeze sleeve    Vasocompression    __° temp    ____pressure     _____ min    Electrical Stim:    [] IFC     [] MFAC      [] HVPC                          Protocol:_______  _____X_____min                          #Channels:  [] 1        [] 2       [] Other:    Ultrasound: ______W/cm2 x  ______min              [] 1MHz   [] 3Mhz                      Duty Factor: [] 100%  [] 50%   [] 20%                  Add: [] Lidex   [] Stim    [] Gel pad       Massage:    [] Soft Tissue   [] Cross Friction                      [] Ice ____min   2 Iontophoresis:  IOGEL:  [] 1.5ml   [] 2.5ml   [] 3.5ml                                            [] 1.0ml   [] 1.3ml                 Dosage:  [] 24 mA/min   [] 40 mA/min  []  80 mA/min                Channels:   [] 1    [] 2                [x] 4mg/ml Dexamethasone Sodium Phosphate Dosage 24-80 mAmin                [] Acetic Acid       [] Other     [x] Drug allergies reviewed    ___GF____Initials           ___6/01____Date    Traction:   [] Prone   [] Supine   X _______min               [] Lumbar x ____lbs      [] Cervical x _____lbs               [] Continuous     [] Intermittent  -   On:_____x Off:_____   1 Other: ex, man              6/8/2022 Visit #2     Exercise Reps/ Time Weight/ Level Comments               Radial wrist, thumb stretch 10x10s       Stretch wrist flex, ext 10x10s       Thumb ext stretch 10x10s       Thumb abd stretch 10x10s       Thumb opp stretch 10x10s       Opposition 10x      Wrist ROM 20x ea   RD most painful   Light towel squeeze 20x      manual: STR 1st CMC joint area      Specific Instructions for next treatment:        Treatment Charges: Mins Units   [x]  Modalities: CP 15 15   [x]  Ther Exercise 23 2   [x]  Manual Therapy 5 nc   []  Ther Activities     []  Aquatics     []  Vasocompression     [x]  Other: IP 15 1   Total Treatment time 43 3       Assessment: [x] Progressing toward goals. Reviewed and completed previous stretches, cueing for appropriate hold times for maximum benefit. Minor cueing needed for recall. Opposition movements aggravated symptoms but only very minor, more of a feeling of stiffness in the thumb. Instructed pt in very light towel  ex as well as wrist ROM ex, good tolerance. Only tender right at 1st [facility] joint. Ended with IP and CP as previous to address pain symptoms. Will progress pt as tierra. [] No change. [] Other:  [x] Patient would continue to benefit from skilled physical therapy services in order to: restore ROM and progress L hand/wrist strength for improved function. SHORT TERM GOALS ( 8 visits)  Wrist, thumb pain = 0  Wrist, thumb ROM = WNL  Wrist, thumbt strength = 4+/5  Wrist, thumb function: , lift, twist w/o pain     LONG TERM GOALS ( 12 visits)  Independent Home Exercise program  Return to normal activity     PATIENT GOAL  Full motion, no pain    Pt. Education:  [x] Yes  [] No  [x] Reviewed Prior HEP/Ed  Method of Education: [x] Verbal  [] Demo  [] Written  Comprehension of Education:  [x] Verbalizes understanding. [x] Demonstrates understanding. [] Needs review. [] Demonstrates/verbalizes HEP/Ed previously given. Plan: [x] Continue current frequency toward long and short term goals.     [x] Specific Instructions for subsequent treatments: cont per POC      Time In: 5:03 pm           Time Out: 5:48 pm    Electronically signed by:  Rita Carpenter PTA

## 2022-06-10 ENCOUNTER — HOSPITAL ENCOUNTER (OUTPATIENT)
Dept: PHYSICAL THERAPY | Facility: CLINIC | Age: 32
Setting detail: THERAPIES SERIES
Discharge: HOME | End: 2022-06-10
Payer: COMMERCIAL

## 2022-06-10 PROCEDURE — 97033 APP MDLTY 1+IONTPHRSIS EA 15: CPT

## 2022-06-10 PROCEDURE — 97110 THERAPEUTIC EXERCISES: CPT

## 2022-06-10 NOTE — FLOWSHEET NOTE
[] [facility]) [facility] &  Therapy  [address]  [phone]  F: [phone] [] [address]  [address] [facility] 36   [address]  P: [phone]  F: [phone] [x] [place] &  Therapy  [address]  P: [phone]  F: [phone] [] [address]  P: [phone]  F: [phone] [] [address]  [facility]   [address]   [place]: [phone]  F: [phone]      Physical Therapy Daily Treatment Note    Date:  6/10/2022  Patient Name:  Manuel Postal    :  1990  MRN: [id number]  Physician: [address] [place] [address] 21: [facility]              Eligibility Status: Raven Simpson  DOS:   # of visits allowed/remainin  Source: Website  Spoke To:  [employer]  Reference: [id number]  Auth: NPRe  Fabiano yr; visit limit  remain; $15 copay; ERF$8140-$8465. 07remain; OPP$6350-$6255.53remain; No Coinsurance  Medical Diagnosis: L DeQuervain's Tenosynovitis                Rehab Codes: M65.4  Onset Date: 3/22   Visit# / total visits: 3/12     Cancels/No Shows: 0/0    Subjective:      Pain:  [x] Yes  [] No Location: L wrist/hand  Pain Rating: (0-10 scale) 0/10  Pain altered Tx:  [x] No  [] Yes  Action:  Comments: Pt notes no pain upon arrival, cont to have some difficulty with tossing motions. Has been doing HEP in the am and ice sleeve in the evenings. Lifting while cleaning the litter box and lifting cast iron pan. Objective:       Todays Treatment:  Treatment Location  Left      Right                          Position    [x]          []  [] Supine    [] Prone   [] Side lying  [] Sitting          Treatment Modality    Hot Pack:    [] Large   [] Medium    [] Cervical     _____ min   2 Cold Pack   [x] Large   [] Medium    [] Cervical     ___15__ min - freeze sleeve    Vasocompression    __° temp    ____pressure     _____ min    Electrical Stim:    [] IFC     [] MFAC      [] HVPC                          Protocol:_______  _____X_____min                          #Channels:  [] 1        [] 2       [] Other:    Ultrasound: ______W/cm2 x  ______min              [] 1MHz   [] 3Mhz                      Duty Factor: [] 100%  [] 50%   [] 20%                  Add: [] Lidex   [] Stim    [] Gel pad       Massage:    [] Soft Tissue   [] Cross Friction                      [] Ice ____min   2 Iontophoresis:  IOGEL:  [] 1.5ml   [] 2.5ml   [] 3.5ml                                            [] 1.0ml   [] 1.3ml                 Dosage:  [] 24 mA/min   [] 40 mA/min  []  80 mA/min                Channels:   [] 1    [] 2                [x] 4mg/ml Dexamethasone Sodium Phosphate Dosage 24-80 mAmin                [] Acetic Acid       [] Other     [x] Drug allergies reviewed    ___GF____Initials           ___6/01____Date    Traction:   [] Prone   [] Supine   X _______min               [] Lumbar x ____lbs      [] Cervical x _____lbs               [] Continuous     [] Intermittent  -   On:_____x Off:_____   1 Other: ex, man              6/8/2022 Visit #2     Exercise Reps/ Time Weight/ Level Comments               Radial wrist, thumb stretch 10x10s       Stretch wrist flex, ext 10x10s       Thumb ext stretch 10x10s       Thumb abd stretch 10x10s       Thumb opp stretch 10x10s       Opposition 10x      Wrist ROM 20x ea   RD most painful   Light towel squeeze 20x     Wrist flexion, ext 20x 1#    Wrist RD, UD 20x 1#     manual: STR 1st CMC joint area      Specific Instructions for next treatment:        Treatment Charges: Mins Units   [x]  Modalities: CP 15 Nc    [x]  Ther Exercise 23 2   [x]  Manual Therapy 5 nc   []  Ther Activities     []  Aquatics     []  Vasocompression     [x]  Other: IP 15 1   Total Treatment time 43 3 Assessment: [x] Progressing toward goals. Pt displays good recall of current program, completed stretches above and manual with good release achieved. Added strengthening ex with good tolerance, some compensations noted. Cued to avoid elbow flexion. Concluded with ionto and CP to reduce inflammation and pain. [] No change. [] Other:  [x] Patient would continue to benefit from skilled physical therapy services in order to: restore ROM and progress L hand/wrist strength for improved function. SHORT TERM GOALS ( 8 visits)  Wrist, thumb pain = 0  Wrist, thumb ROM = WNL  Wrist, thumb strength = 4+/5  Wrist, thumb function: , lift, twist w/o pain     LONG TERM GOALS ( 12 visits)  Independent Home Exercise program  Return to normal activity     PATIENT GOAL  Full motion, no pain    Pt. Education:  [x] Yes  [] No  [x] Reviewed Prior HEP/Ed  Method of Education: [x] Verbal  [] Demo  [] Written  Comprehension of Education:  [x] Verbalizes understanding. [x] Demonstrates understanding. [] Needs review. [] Demonstrates/verbalizes HEP/Ed previously given. Plan: [x] Continue current frequency toward long and short term goals.     [x] Specific Instructions for subsequent treatments: cont per POC      Time In: 5:03 pm           Time Out: 6:05 pm    Electronically signed by:  Esthela Galo PTA

## 2022-06-15 ENCOUNTER — HOSPITAL ENCOUNTER (OUTPATIENT)
Dept: PHYSICAL THERAPY | Facility: CLINIC | Age: 32
Setting detail: THERAPIES SERIES
Discharge: HOME | End: 2022-06-15
Payer: COMMERCIAL

## 2022-06-15 PROCEDURE — 97033 APP MDLTY 1+IONTPHRSIS EA 15: CPT

## 2022-06-15 PROCEDURE — 97110 THERAPEUTIC EXERCISES: CPT

## 2022-06-15 NOTE — FLOWSHEET NOTE
[] [facility]) [facility] CENTER &  Therapy  [address]  [phone]  F: [phone] [] [address]  [facility]   [address]  P: [phone]  F: [phone] [x] [address]  [address]  P: [phone]  F: [phone] [] [address]  P: [phone]  F: [phone] [] [address]  [facility]   [address]   [place]: [phone]  F: [phone]      Physical Therapy Daily Treatment Note    Date:  6/15/2022  Patient Name:  Luis Manuel Shah    :  1990  MRN: [id number]  Physician: [address] [place] [address] 21: [facility]              Eligibility Status: [facility]  DOS:   # of visits allowed/remainin  Source: Website  Spoke To:  [employer]  Reference: [id number]  Auth: NPRe  Fabiano yr; visit limit  remain; $15 copay; YZL$6955-$6016. 07remain; OPP$6350-$6255.53remain; No Coinsurance  Medical Diagnosis: L DeQuervain's Tenosynovitis                Rehab Codes: M65.4  Onset Date: 3/22   Visit# / total visits:      Cancels/No Shows: 0/0    Subjective: continues to feel better. Does still get intermittent pain throughout the day but no able to say specifically what may cause the issue. Does feel that it will get tired toward the evening. Pain:  [] Yes  [x] No Location: L wrist/hand  Pain Rating: (0-10 scale) 0/10  Pain altered Tx:  [x] No  [] Yes  Action:  Comments:      Objective:       Todays Treatment:  Treatment Location  Left      Right                          Position    [x]          []  [] Supine    [] Prone   [] Side lying  [] Sitting          Treatment Modality    Hot Pack:    [] Large   [] Medium    [] Cervical     _____ min   2 Cold Pack   [x] Large   [] Medium [] Cervical     ___15__ min - freeze sleeve    Vasocompression    __° temp    ____pressure     _____ min    Electrical Stim:    [] IFC     [] MFAC      [] HVPC                          Protocol:_______  _____X_____min                          #Channels:  [] 1        [] 2       [] Other:    Ultrasound: ______W/cm2 x  ______min              [] 1MHz   [] 3Mhz                      Duty Factor: [] 100%  [] 50%   [] 20%                  Add: [] Lidex   [] Stim    [] Gel pad       Massage:    [] Soft Tissue   [] Cross Friction                      [] Ice ____min   2 Iontophoresis:  IOGEL:  [] 1.5ml   [] 2.5ml   [] 3.5ml                                            [] 1.0ml   [] 1.3ml                 Dosage:  [] 24 mA/min   [] 40 mA/min  []  80 mA/min                Channels:   [] 1    [] 2                [x] 4mg/ml Dexamethasone Sodium Phosphate Dosage 24-80 mAmin                [] Acetic Acid       [] Other     [x] Drug allergies reviewed    ___GF____Initials           ___6/01____Date    Traction:   [] Prone   [] Supine   X _______min               [] Lumbar x ____lbs      [] Cervical x _____lbs               [] Continuous     [] Intermittent  -   On:_____x Off:_____   1 Other: ex, man              6/8/2022 Visit #3     Exercise Reps/ Time Weight/ Level Comments   UBE 5'           Radial wrist, thumb stretch 10x10s       Stretch wrist flex, ext 10x10s       Thumb ext stretch 10x10s       Thumb abd stretch 10x10s       Thumb opp stretch 10x10s       Wrist maze 5x     Opposition 10x      Wrist ROM 20x ea   RD most painful   Light towel squeeze 20x     Foam squeeze  20x blue    Wrist flexion, ext 20x 1#    Wrist RD, UD 20x 1#                 manual: STR 1st CMC joint area      Specific Instructions for next treatment:        Treatment Charges: Mins Units   [x]  Modalities: CP 15 Nc    [x]  Ther Exercise 25 2   [x]  Manual Therapy 5 nc   []  Ther Activities     []  Aquatics     []  Vasocompression     [x]  Other: IP 15 1 Total Treatment time 45 3       Assessment: [x] Progressing toward goals. Added UBE and wrist maze to further address ROM. Increased pain with weighted wrist extension, 3/10 rated by pt. Does still feel limited with certain movements of the wrist and thumb that causes increased soreness. Cueing provided to not push past that point. Point tender at first [facility] joint. Continued with IP and cold pack to address pain symptoms. [] No change. [] Other:  [x] Patient would continue to benefit from skilled physical therapy services in order to: restore ROM and progress L hand/wrist strength for improved function. SHORT TERM GOALS ( 8 visits)  Wrist, thumb pain = 0  Wrist, thumb ROM = WNL  Wrist, thumb strength = 4+/5  Wrist, thumb function: , lift, twist w/o pain     LONG TERM GOALS ( 12 visits)  Independent Home Exercise program  Return to normal activity     PATIENT GOAL  Full motion, no pain    Pt. Education:  [x] Yes  [] No  [x] Reviewed Prior HEP/Ed  Method of Education: [x] Verbal  [] Demo  [] Written  Comprehension of Education:  [x] Verbalizes understanding. [x] Demonstrates understanding. [] Needs review. [] Demonstrates/verbalizes HEP/Ed previously given. Plan: [x] Continue current frequency toward long and short term goals.     [x] Specific Instructions for subsequent treatments: cont per POC      Time In: 5:04 pm           Time Out: 5:52 pm    Electronically signed by:  Kiersten Tyson PTA

## 2022-06-17 ENCOUNTER — HOSPITAL ENCOUNTER (OUTPATIENT)
Dept: PHYSICAL THERAPY | Facility: CLINIC | Age: 32
Setting detail: THERAPIES SERIES
Discharge: HOME | End: 2022-06-17
Payer: COMMERCIAL

## 2022-06-17 PROCEDURE — 97033 APP MDLTY 1+IONTPHRSIS EA 15: CPT

## 2022-06-17 PROCEDURE — 97110 THERAPEUTIC EXERCISES: CPT

## 2022-06-17 NOTE — FLOWSHEET NOTE
[] [facility]) [facility] &  Therapy  [address]  [phone]  F: [phone] [] [address]  [facility]   [address]  P: [phone]  F: [phone] [x] [address]  [address]  P: [phone]  F: [phone] [] [address]  P: [phone]  F: [phone] [] [address]  [facility]   [address]   [place]: [phone]  F: [phone]      Physical Therapy Daily Treatment Note    Date:  2022  Patient Name:  Festus Olivarez    :  1990  MRN: [id number]  Physician: [address] [place] [address] 21: [facility]              Eligibility Status: Angel Hagen  DOS:   # of visits allowed/remainin  Source: Website  Spoke To:  [employer]  Reference: [id number]  Auth: NPRe  Fabiano yr; visit limit  remain; $15 copay; WHT$6769-$5007. 07remain; OPP$6350-$6255.53remain; No Coinsurance  Medical Diagnosis: L DeQuervain's Tenosynovitis                Rehab Codes: M65.4  Onset Date: 3/22   Visit# / total visits:      Cancels/No Shows: 0/0    Subjective: Pt reports min achiness of L radial wrist, base of thumb region, less tenderness, feels symptoms are improving     Pain:  [] Yes  [x] No Location: L wrist/hand  Pain Rating: (0-10 scale) 2/10  Pain altered Tx:  [x] No  [] Yes  Action:  Comments:      Objective:       Todays Treatment:  Treatment Location  Left      Right                          Position    [x]          []  [] Supine    [] Prone   [] Side lying  [] Sitting          Treatment Modality    Hot Pack:    [] Large   [] Medium    [] Cervical     _____ min   2 Cold Pack   [x] Large   [] Medium    [] Cervical     ___15__ min - freeze sleeve    Vasocompression    __° temp ____pressure     _____ min    Electrical Stim:    [] IFC     [] MFAC      [] HVPC                          Protocol:_______  _____X_____min                          #Channels:  [] 1        [] 2       [] Other:    Ultrasound: ______W/cm2 x  ______min              [] 1MHz   [] 3Mhz                      Duty Factor: [] 100%  [] 50%   [] 20%                  Add: [] Lidex   [] Stim    [] Gel pad       Massage:    [] Soft Tissue   [] Cross Friction                      [] Ice ____min   2 Iontophoresis:  IOGEL:  [] 1.5ml   [x] 2.5ml   [] 3.5ml                                            [] 1.0ml   [] 1.3ml                 Dosage:  [] 24 mA/min   [x] 40 mA/min  []  80 mA/min                Channels:   [x] 1    [] 2                [x] 4mg/ml Dexamethasone Sodium Phosphate Dosage 24-80 mAmin                [] Acetic Acid       [] Other     [x] Drug allergies reviewed    ___GF____Initials           ___6/01____Date    Traction:   [] Prone   [] Supine   X _______min               [] Lumbar x ____lbs      [] Cervical x _____lbs               [] Continuous     [] Intermittent  -   On:_____x Off:_____   1 Other: ex, man              6/17/2022  Visit #4     Exercise Reps/ Time Weight/ Level Comments   UBE 10 min           Radial wrist, thumb stretch 10x10s       Stretch wrist flex, ext 10x10s       Thumb ext stretch 10x10s       Thumb abd stretch 10x10s       Thumb opp stretch 10x10s       Wrist maze 5x     Opposition 10x      Tband thumb ext 3x10 ylw     Tband thumb abd 3x10 ylw    Foam thumb press  3x10 grn    Wrist flexion, ext 3x10 2#    Wrist rad dev 3x10 2#    Wrist pron/sup 3x10 2#           manual: STR 1st CMC joint area      Specific Instructions for next treatment:        Treatment Charges: Mins Units   [x]  Modalities: CP 15 Nc    [x]  Ther Exercise 30 2   []  Manual Therapy     []  Ther Activities     []  Aquatics     []  Vasocompression     [x]  Other: IP 15 1   Total Treatment time 45 3       Assessment: [x] Progressing toward goals. [] No change. [x] Other: Pt able to progress resistive exercises and reps, noted fatigue of extensor tendons but no worse pain. [x] Patient would continue to benefit from skilled physical therapy services in order to: restore ROM and progress L hand/wrist strength for improved function. SHORT TERM GOALS ( 8 visits)  Wrist, thumb pain = 0  Wrist, thumb ROM = WNL  Wrist, thumb strength = 4+/5  Wrist, thumb function: , lift, twist w/o pain     LONG TERM GOALS ( 12 visits)  Independent Home Exercise program  Return to normal activity     PATIENT GOAL  Full motion, no pain    Pt. Education:  [x] Yes  [] No  [x] Reviewed Prior HEP/Ed  Method of Education: [x] Verbal  [] Demo  [] Written  Comprehension of Education:  [x] Verbalizes understanding. [x] Demonstrates understanding. [] Needs review. [] Demonstrates/verbalizes HEP/Ed previously given. Plan: [x] Continue current frequency toward long and short term goals.     [x] Specific Instructions for subsequent treatments: cont per POC      Time In: 5:04 pm           Time Out: 5:52 pm    Electronically signed by:  Chon Pinedo, PT

## 2022-06-20 DIAGNOSIS — M65.4 DE QUERVAIN'S DISEASE (RADIAL STYLOID TENOSYNOVITIS): ICD-10-CM

## 2022-06-20 RX ORDER — NAPROXEN 500 MG/1
TABLET ORAL
Qty: 60 TABLET | Refills: 0 | Status: SHIPPED | OUTPATIENT
Start: 2022-06-20

## 2022-06-22 ENCOUNTER — HOSPITAL ENCOUNTER (OUTPATIENT)
Dept: PHYSICAL THERAPY | Facility: CLINIC | Age: 32
Setting detail: THERAPIES SERIES
Discharge: HOME | End: 2022-06-22
Payer: COMMERCIAL

## 2022-06-22 PROCEDURE — 97110 THERAPEUTIC EXERCISES: CPT

## 2022-06-22 PROCEDURE — 97033 APP MDLTY 1+IONTPHRSIS EA 15: CPT

## 2022-06-22 NOTE — FLOWSHEET NOTE
[] [facility]) [facility] CENTER &  Therapy  [address]  [phone]  F: [phone] [] [address]  [facility]   [address]  P: [phone]  F: [phone] [x] [address]  [address]  P: [phone]  F: [phone] [] [address]  P: [phone]  F: [phone] [] [address]  [facility]   [address]   [place]: [phone]  F: [phone]      Physical Therapy Daily Treatment Note    Date:  2022  Patient Name:  Festus Olivarez    :  1990  MRN: [id number]  Physician: [address] [place] [address] 21: [facility]              Eligibility Status: Angel Hagen  DOS:   # of visits allowed/remainin  Source: Website  Spoke To:  [employer]  Reference: [id number]  Auth: NPRe  Fabiano yr; visit limit  remain; $15 copay; XEF$8457-$6978. 07remain; OPP$6350-$6255.53remain; No Coinsurance  Medical Diagnosis: L DeQuervain's Tenosynovitis                Rehab Codes: M65.4  Onset Date: 3/22   Visit# / total visits:      Cancels/No Shows: 0/0    Subjective:   Pain:  [] Yes  [x] No Location: L wrist/hand  Pain Rating: (0-10 scale) 0/10  Pain altered Tx:  [x] No  [] Yes  Action:  Comments: Pt reports skin irritation from IP last tx, primary PT assessed and cleared to continue with IP medicated pad proximal to location. Objective:       Todays Treatment:  Treatment Location  Left      Right                          Position    [x]          []  [] Supine    [] Prone   [] Side lying  [] Sitting          Treatment Modality    Hot Pack:    [] Large   [] Medium    [] Cervical     _____ min   2 Cold Pack   [x] Large   [] Medium    [] Cervical     ___15__ min - freeze sleeve Vasocompression    __° temp    ____pressure     _____ min    Electrical Stim:    [] IFC     [] MFAC      [] HVPC                          Protocol:_______  _____X_____min                          #Channels:  [] 1        [] 2       [] Other:    Ultrasound: ______W/cm2 x  ______min              [] 1MHz   [] 3Mhz                      Duty Factor: [] 100%  [] 50%   [] 20%                  Add: [] Lidex   [] Stim    [] Gel pad       Massage:    [] Soft Tissue   [] Cross Friction                      [] Ice ____min   2 Iontophoresis:  IOGEL:  [] 1.5ml   [x] 2.5ml   [] 3.5ml                                            [] 1.0ml   [] 1.3ml                 Dosage:  [] 24 mA/min   [x] 40 mA/min  []  80 mA/min                Channels:   [x] 1    [] 2                [x] 4mg/ml Dexamethasone Sodium Phosphate Dosage 24-80 mAmin                [] Acetic Acid       [] Other     [x] Drug allergies reviewed    ___GF____Initials           ___6/01____Date    Traction:   [] Prone   [] Supine   X _______min               [] Lumbar x ____lbs      [] Cervical x _____lbs               [] Continuous     [] Intermittent  -   On:_____x Off:_____   1 Other: ex, man              6/22/2022  Visit #4     Exercise Reps/ Time Weight/ Level Comments   UBE 10 min           Radial wrist, thumb stretch 10x10s       Stretch wrist flex, ext 10x10s       Thumb ext stretch 10x10s       Thumb abd stretch 10x10s       Thumb opp stretch 10x10s       Wrist maze 5x     Opposition x10  [place] board   Tband thumb ext 3x10 ylw     Tband thumb abd 3x10 ylw    Foam thumb press  3x10 grn    Wrist flexion, ext 3x10 2#    Wrist rad dev 3x10 2#    Wrist pron/sup 3x10 2#     squeezes  3x10 3# red     manual: STR 1st CMC joint area      Specific Instructions for next treatment:        Treatment Charges: Mins Units   [x]  Modalities: CP 10 Nc    [x]  Ther Exercise 30 2   []  Manual Therapy 5 Nc   []  Ther Activities     []  Aquatics     []  Vasocompression     [x] Other: IP 10 1   Total Treatment time 45 3       Assessment: [x] Progressing toward goals. [] No change. [x] Other: Cont with current ex program as noted above, added resistance to opposition ex. Added  ex. No inc in pain. Concluded with IP for soreness. [x] Patient would continue to benefit from skilled physical therapy services in order to: restore ROM and progress L hand/wrist strength for improved function. SHORT TERM GOALS ( 8 visits)  Wrist, thumb pain = 0  Wrist, thumb ROM = WNL  Wrist, thumb strength = 4+/5  Wrist, thumb function: , lift, twist w/o pain     LONG TERM GOALS ( 12 visits)  Independent Home Exercise program  Return to normal activity     PATIENT GOAL  Full motion, no pain    Pt. Education:  [x] Yes  [] No  [x] Reviewed Prior HEP/Ed  Method of Education: [x] Verbal  [] Demo  [] Written  Comprehension of Education:  [x] Verbalizes understanding. [x] Demonstrates understanding. [] Needs review. [] Demonstrates/verbalizes HEP/Ed previously given. Plan: [x] Continue current frequency toward long and short term goals.     [x] Specific Instructions for subsequent treatments: cont per POC      Time In: 5:05  pm           Time Out: 6:00 pm    Electronically signed by:  Chang Elkins PTA

## 2022-06-24 ENCOUNTER — HOSPITAL ENCOUNTER (OUTPATIENT)
Dept: PHYSICAL THERAPY | Facility: CLINIC | Age: 32
Setting detail: THERAPIES SERIES
Discharge: HOME | End: 2022-06-24
Payer: COMMERCIAL

## 2022-06-24 PROCEDURE — 97110 THERAPEUTIC EXERCISES: CPT

## 2022-06-24 PROCEDURE — 97033 APP MDLTY 1+IONTPHRSIS EA 15: CPT

## 2022-06-24 NOTE — FLOWSHEET NOTE
[] [facility]) [facility] CENTER &  Therapy  [address]  [phone]  F: [phone] [] [address]  [employer] [address]   [address]  P: [phone]  F: [phone] [x] [address]  [address] [facility] [address]  P: [phone]  F: [phone] [] [address]  P: [phone]  F: [phone] [] [address]  [facility]   [address]   [place]: [phone]  F: [phone]      Physical Therapy Daily Treatment Note    Date:  2022  Patient Name:  Apolinar Hernandez    :  1990  MRN: [id number]  Physician: [address] [place] [address] 21: [facility]              Eligibility Status: Kandi Jasso  DOS:   # of visits allowed/remainin  Source: Website  Spoke To:  [employer]  Reference: [id number]  Auth: NPRe  Fabiano yr; visit limit  remain; $15 copay; OEU$2055-$0498. 07remain; OPP$6350-$6255.53remain; No Coinsurance  Medical Diagnosis: L DeQuervain's Tenosynovitis                Rehab Codes: M65.4  Onset Date: 3/22   Visit# / total visits:      Cancels/No Shows: 0/0    Subjective: Continues to report improvement, still gets intermittent pain and \"cracks\" but overall good. No further irritation from IP following her previous visit. Pain:  [] Yes  [x] No Location: L wrist/hand  Pain Rating: (0-10 scale) 0/10  Pain altered Tx:  [x] No  [] Yes  Action:  Comments:     Objective:       Todays Treatment:  Treatment Location  Left      Right                          Position    [x]          []  [] Supine    [] Prone   [] Side lying  [] Sitting          Treatment Modality    Hot Pack:    [] Large   [] Medium    [] Cervical     _____ min   2 Cold Pack   [x] Large   [] Medium    [] Cervical     ___15__ min - freeze sleeve    Vasocompression    __° temp    ____pressure     _____ min    Electrical Stim:    [] IFC     [] MFAC      [] HVPC                          Protocol:_______  _____X_____min                          #Channels:  [] 1        [] 2       [] Other:    Ultrasound: ______W/cm2 x  ______min              [] 1MHz   [] 3Mhz                      Duty Factor: [] 100%  [] 50%   [] 20%                  Add: [] Lidex   [] Stim    [] Gel pad       Massage:    [] Soft Tissue   [] Cross Friction                      [] Ice ____min   2 Iontophoresis:  IOGEL:  [] 1.5ml   [x] 2.5ml   [] 3.5ml                                            [] 1.0ml   [] 1.3ml                 Dosage:  [] 24 mA/min   [x] 40 mA/min  []  80 mA/min                Channels:   [x] 1    [] 2                [x] 4mg/ml Dexamethasone Sodium Phosphate Dosage 24-80 mAmin                [] Acetic Acid       [] Other     [x] Drug allergies reviewed    ___GF____Initials           ___6/01____Date    Traction:   [] Prone   [] Supine   X _______min               [] Lumbar x ____lbs      [] Cervical x _____lbs               [] Continuous     [] Intermittent  -   On:_____x Off:_____   1 Other: ex, man              6/24/2022  Visit #7     Exercise Reps/ Time Weight/ Level Comments   UBE 10 min           Radial wrist, thumb stretch 10x10s       Stretch wrist flex, ext 10x10s       Thumb ext stretch 10x10s       Thumb abd stretch 10x10s       Thumb opp stretch 10x10s       Wrist maze 5x     Opposition x10  [place] board   Tband thumb ext 3x10 ylw     Tband thumb abd 3x10 ylw    Foam thumb press  3x10 grn    Wrist flexion, ext 3x10 2#    Wrist rad dev 3x10 2#    Pronator  3x10 1#     squeezes  3x10 3# red     manual: STR 1st CMC joint area      Specific Instructions for next treatment:        Treatment Charges: Mins Units   [x]  Modalities: CP 10 Nc    [x]  Ther Exercise 30 2   []  Manual Therapy     []  Ther Activities     []  Aquatics     []  Vasocompression     [x] Other: IP 10 1   Total Treatment time 45 3       Assessment: [x] Progressing toward goals. Continued with ROM and strengthening ex focused on improving L wrist and hand function. Pronator used this date, cueing to decrease shoulder compensation movement. Same cueing whilie using the wrist maze as well, good follow through. No pain symptoms with progression through treatment. One small red spot noted under medication patch following treatment. [] No change. [x] Other:    [x] Patient would continue to benefit from skilled physical therapy services in order to: restore ROM and progress L hand/wrist strength for improved function. SHORT TERM GOALS ( 8 visits)  Wrist, thumb pain = 0  Wrist, thumb ROM = WNL  Wrist, thumb strength = 4+/5  Wrist, thumb function: , lift, twist w/o pain     LONG TERM GOALS ( 12 visits)  Independent Home Exercise program  Return to normal activity     PATIENT GOAL  Full motion, no pain    Pt. Education:  [x] Yes  [] No  [x] Reviewed Prior HEP/Ed  Method of Education: [x] Verbal  [] Demo  [] Written  Comprehension of Education:  [x] Verbalizes understanding. [x] Demonstrates understanding. [] Needs review. [] Demonstrates/verbalizes HEP/Ed previously given. Plan: [x] Continue current frequency toward long and short term goals.     [x] Specific Instructions for subsequent treatments: cont per POC      Time In: 4:33  pm           Time Out: 5:20 pm    Electronically signed by:  Renzo Delgado PTA

## 2022-06-28 ENCOUNTER — HOSPITAL ENCOUNTER (OUTPATIENT)
Dept: PHYSICAL THERAPY | Facility: CLINIC | Age: 32
Setting detail: THERAPIES SERIES
Discharge: HOME | End: 2022-06-28
Payer: COMMERCIAL

## 2022-06-28 PROCEDURE — 97033 APP MDLTY 1+IONTPHRSIS EA 15: CPT

## 2022-06-28 PROCEDURE — 97110 THERAPEUTIC EXERCISES: CPT

## 2022-07-01 ENCOUNTER — HOSPITAL ENCOUNTER (OUTPATIENT)
Dept: PHYSICAL THERAPY | Facility: CLINIC | Age: 32
Setting detail: THERAPIES SERIES
Discharge: HOME | End: 2022-07-01
Payer: COMMERCIAL

## 2022-07-01 PROCEDURE — 97110 THERAPEUTIC EXERCISES: CPT

## 2022-07-01 PROCEDURE — 97033 APP MDLTY 1+IONTPHRSIS EA 15: CPT

## 2022-07-01 NOTE — FLOWSHEET NOTE
[] [facility]) [facility] &  Therapy  [address]  [phone]  F: [phone] [] [address]  [facility] 36   [address]  P: [phone]  F: [phone] [x] [address]  [address]  P: [phone]  F: [phone] [] [address]  P: [phone]  F: [phone] [] [address]  [facility]   [address]   [place]: [phone]  F: [phone]      Physical Therapy Daily Treatment Note    Date:  2022  Patient Name:  Veronica Porter    :  1990  MRN: [id number]  Physician: [address] [place] [address] 21: [facility]              Eligibility Status: Radha Landaverde  DOS:   # of visits allowed/remainin  Source: Website  Spoke To:  [employer]  Reference: [id number]  Auth: NPRe  Fabiano yr; visit limit  remain; $15 copay; ELK$3998-$2047. 07remain; OPP$6350-$6255.53remain; No Coinsurance  Medical Diagnosis: L DeQuervain's Tenosynovitis                Rehab Codes: M65.4  Onset Date: 3/22   Visit# / total visits:      Cancels/No Shows: 0/0    Subjective:   Pain:  [] Yes  [x] No Location: L wrist/hand  Pain Rating: (0-10 scale) 0/10   Pain altered Tx:  [x] No  [] Yes  Action:  Comments: Pt notes no changes since last appt. Objective:       Todays Treatment:  Treatment Location  Left      Right                          Position    [x]          []  [] Supine    [] Prone   [] Side lying  [] Sitting          Treatment Modality    Hot Pack:    [] Large   [] Medium    [] Cervical     _____ min   2 Cold Pack   [x] Large   [] Medium    [] Cervical     ___15__ min - freeze sleeve    Vasocompression    __° temp    ____pressure     _____ min    Electrical Stim:    [] IFC     [] MFAC [] HVPC                          Protocol:_______  _____X_____min                          #Channels:  [] 1        [] 2       [] Other:    Ultrasound: ______W/cm2 x  ______min              [] 1MHz   [] 3Mhz                      Duty Factor: [] 100%  [] 50%   [] 20%                  Add: [] Lidex   [] Stim    [] Gel pad       Massage:    [] Soft Tissue   [] Cross Friction                      [] Ice ____min   2 Iontophoresis:  IOGEL:  [] 1.5ml   [x] 2.5ml   [] 3.5ml                                            [] 1.0ml   [] 1.3ml                 Dosage:  [] 24 mA/min   [x] 40 mA/min  []  80 mA/min                Channels:   [x] 1    [] 2                [x] 4mg/ml Dexamethasone Sodium Phosphate Dosage 24-80 mAmin                [] Acetic Acid       [] Other     [x] Drug allergies reviewed    ___GF____Initials           ___6/01____Date    Traction:   [] Prone   [] Supine   X _______min               [] Lumbar x ____lbs      [] Cervical x _____lbs               [] Continuous     [] Intermittent  -   On:_____x Off:_____   1 Other: ex, man              7/1/2022  Visit #7     Exercise Reps/ Time Weight/ Level Comments   UBE 10 min           Radial wrist, thumb stretch 10x10s       Stretch wrist flex, ext 10x10s       Thumb ext stretch 10x10s       Thumb abd stretch 10x10s       Thumb opp stretch 10x10s       Wrist maze 5x     Opposition 3x10  Yellow [place] board   Resisted thumb abduction  3x10  Yellow rubber band x2    Resisted thumb ext 3x10  Yellow rubber band x2    Foam thumb press  3x10 Black clip    Wrist flexion, ext 3x10 4# Extenion to neutral    Wrist rad dev 3x10 4#    Pronator  3x10 3#     squeezes  3x10 Light blue  squeezer x20, red digiflex x10   Wrist roll with weight 5x      manual: STR 1st CMC joint area      Specific Instructions for next treatment:        Treatment Charges: Mins Units   [x]  Modalities: CP 10 Nc    [x]  Ther Exercise 30 2   []  Manual Therapy 5 nc   []  Ther Activities     [] Aquatics     []  Vasocompression     [x]  Other: IP 10 1   Total Treatment time 45 3       Assessment: [x] Progressing toward goals. Continued to progress strengthening as noted above with additional resistance. Good tolerance to all progressions, some inc in mm fatigue noted. Continued to conclude with IP and freeze sleeve to reduce soreness. Plan for re-eval with primary therapist to further review goals next week. [] No change. [x] Other:    [x] Patient would continue to benefit from skilled physical therapy services in order to: restore ROM and progress L hand/wrist strength for improved function. SHORT TERM GOALS ( 8 visits)  Wrist, thumb pain = 0  Wrist, thumb ROM = WNL  Wrist, thumb strength = 4+/5  Wrist, thumb function: , lift, twist w/o pain     LONG TERM GOALS ( 12 visits)  Independent Home Exercise program  Return to normal activity     PATIENT GOAL  Full motion, no pain    Pt. Education:  [x] Yes  [] No  [x] Reviewed Prior HEP/Ed  Method of Education: [x] Verbal  [] Demo  [] Written  Comprehension of Education:  [x] Verbalizes understanding. [x] Demonstrates understanding. [] Needs review. [] Demonstrates/verbalizes HEP/Ed previously given. Plan: [x] Continue current frequency toward long and short term goals.     [x] Specific Instructions for subsequent treatments: cont per POC      Time In: 5:00 pm           Time Out: 6:05 pm    Electronically signed by:  Hua Vogt PTA

## 2022-07-06 ENCOUNTER — HOSPITAL ENCOUNTER (OUTPATIENT)
Dept: PHYSICAL THERAPY | Facility: CLINIC | Age: 32
Setting detail: THERAPIES SERIES
Discharge: HOME | End: 2022-07-06
Payer: COMMERCIAL

## 2022-07-06 PROCEDURE — 97110 THERAPEUTIC EXERCISES: CPT

## 2022-07-06 PROCEDURE — 97033 APP MDLTY 1+IONTPHRSIS EA 15: CPT

## 2022-07-06 NOTE — FLOWSHEET NOTE
[] [facility] &  Therapy  [address]  [phone]  F: [phone] [] [address]  [employer] 36   [address]  P: [phone]  F: [phone] [x] [address]  [address]  P: [phone]  F: [phone] [] [address]  P: [phone]  F: [phone] [] [address]  [facility]   [address]   [place]: [phone]  F: [phone]      Physical Therapy Daily Treatment Note    Date:  2022  Patient Name:  Lorena Lopez    :  1990  MRN: [id number]  Physician: [address] [place] [address] 21: [facility]              Eligibility Status: Popeye Monroy  DOS:   # of visits allowed/remainin  Source: Website  Spoke To:  [employer]  Reference: [id number]  Auth: DOLLYe  Fabiano yr; visit limit  remain; $15 copay; APF$4581-$7528. 07remain; OPP$6350-$6255.53remain; No Coinsurance  Medical Diagnosis: L DeQuervain's Tenosynovitis                Rehab Codes: M65.4  Onset Date: 3/22   Visit# / total visits: 10/12     Cancels/No Shows: 0/0    Subjective:   Pain:  [] Yes  [x] No Location: L wrist/hand  Pain Rating: (0-10 scale) 0/10   Pain altered Tx:  [x] No  [] Yes  Action:  Comments: Pt continues to arrive with no changes. Objective:       Todays Treatment:  Treatment Location  Left      Right                          Position    [x]          []  [] Supine    [] Prone   [] Side lying  [] Sitting          Treatment Modality    Hot Pack:    [] Large   [] Medium    [] Cervical     _____ min   2 Cold Pack   [x] Large   [] Medium    [] Cervical     ___15__ min - freeze sleeve    Vasocompression    __° temp    ____pressure     _____ min    Electrical Stim:    [] IFC     [] MFAC [] HVPC                          Protocol:_______  _____X_____min                          #Channels:  [] 1        [] 2       [] Other:    Ultrasound: ______W/cm2 x  ______min              [] 1MHz   [] 3Mhz                      Duty Factor: [] 100%  [] 50%   [] 20%                  Add: [] Lidex   [] Stim    [] Gel pad       Massage:    [] Soft Tissue   [] Cross Friction                      [] Ice ____min   2 Iontophoresis:  IOGEL:  [] 1.5ml   [x] 2.5ml   [] 3.5ml                                            [] 1.0ml   [] 1.3ml                 Dosage:  [] 24 mA/min   [x] 40 mA/min  []  80 mA/min                Channels:   [x] 1    [] 2                [x] 4mg/ml Dexamethasone Sodium Phosphate Dosage 24-80 mAmin                [] Acetic Acid       [] Other     [x] Drug allergies reviewed    ___GF____Initials           ___6/01____Date    Traction:   [] Prone   [] Supine   X _______min               [] Lumbar x ____lbs      [] Cervical x _____lbs               [] Continuous     [] Intermittent  -   On:_____x Off:_____   1 Other: ex, man              7/6/2022  Visit #7     Exercise Reps/ Time Weight/ Level Comments   UBE 5 min           Radial wrist, thumb stretch 10x10s       Stretch wrist flex, ext 10x10s       Thumb ext stretch 10x10s       Thumb abd stretch 10x10s       Thumb opp stretch 10x10s       Wrist maze 5x     Opposition 3x10  Red [place] board   Resisted thumb abduction and adduction 3x10 ea  Yellow rubber band x2    Resisted thumb flexion and ext 3x10 ea  Yellow rubber band x2    Foam thumb press  3x10 Black clip    Wrist flexion, ext 3x10 5# Extension to neutral    Wrist rad dev 3x10 5#    Wrist pronation/supination 3x10 5#    Pronator  3x10 3#     squeezes  3x10 Light blue    Wrist roll with weight 5x          Specific Instructions for next treatment:        Treatment Charges: Mins Units   [x]  Modalities: CP 10 Nc    [x]  Ther Exercise 30 2   []  Manual Therapy     []  Ther Activities     [] Aquatics     []  Vasocompression     [x]  Other: IP 10 1   Total Treatment time 40 3       Assessment: [x] Progressing toward goals. Continued with current stretching program, no discomfort with stretches noted. Inc weight for wrist ex with some mm fatigue noted but no pain. Continued to conclude with IP and freeze sleeve to reduce soreness. Plan for re-eval with primary therapist to further review goals during next visit. [] No change. [x] Other:    [x] Patient would continue to benefit from skilled physical therapy services in order to: restore ROM and progress L hand/wrist strength for improved function. SHORT TERM GOALS ( 8 visits)  Wrist, thumb pain = 0  Wrist, thumb ROM = WNL  Wrist, thumb strength = 4+/5  Wrist, thumb function: , lift, twist w/o pain     LONG TERM GOALS ( 12 visits)  Independent Home Exercise program  Return to normal activity     PATIENT GOAL  Full motion, no pain    Pt. Education:  [x] Yes  [] No  [x] Reviewed Prior HEP/Ed  Method of Education: [x] Verbal  [] Demo  [] Written  Comprehension of Education:  [x] Verbalizes understanding. [x] Demonstrates understanding. [] Needs review. [] Demonstrates/verbalizes HEP/Ed previously given. Plan: [x] Continue current frequency toward long and short term goals.     [x] Specific Instructions for subsequent treatments: cont per POC      Time In: 5:00 pm           Time Out: 6:02 pm    Electronically signed by:  Viki Avelar PTA

## 2022-07-08 ENCOUNTER — HOSPITAL ENCOUNTER (OUTPATIENT)
Dept: PHYSICAL THERAPY | Facility: CLINIC | Age: 32
Setting detail: THERAPIES SERIES
Discharge: HOME | End: 2022-07-08
Payer: COMMERCIAL

## 2022-07-08 PROCEDURE — 97110 THERAPEUTIC EXERCISES: CPT

## 2022-07-08 NOTE — FLOWSHEET NOTE
[] [facility]) [facility]  Therapy  [address]  [phone]  F: [phone] [] [address]  [employer] [address]   [address]  P: [phone]  F: [phone] [x] [address]  [address]  P: [phone]  F: [phone] [] [address]  P: [phone]  F: [phone] [] [address]  [facility]   [address]   [place]: [phone]  F: [phone]      Physical Therapy Progress Report / Discharge Summary    Date:  2022  Patient Name:  Eunice Johnson    :  1990  MRN: [id number]  Physician: [address] [place] [address] 21: [facility]              Eligibility Status: Joesph Moore  DOS:   # of visits allowed/remainin  Source: Website  Spoke To:  [employer]  Reference: [id number]  Auth: NPRe  Fabiano yr; visit limit  remain; $15 copay; [facility]$2523-$5469. 07remain; OPP$6350-$6255.53remain; No Coinsurance  Medical Diagnosis: L DeQuervain's Tenosynovitis                Rehab Codes: M65.4  Onset Date: 3/22   Visit# / total visits: 10/12     Cancels/No Shows: 0/0    Subjective: Pt reports no sig thumb/wrist pain over last 4 days, able to complete gripping, twisting activities w/o symptom aggravation.    Pain:  [] Yes  [x] No Location: L wrist/hand  Pain Rating: (0-10 scale) 0/10   Pain altered Tx:  [x] No  [] Yes  Action:  Comments:     Objective:           L/R    ROM  ° A/P STRENGTH     Elbow Flex WNL WNL 4+ 4+     Ext WNL WNL 4+ 4+     Pron WNL WNL 4+ 4+     Sup WNL WNL 4+ 4+     Wrist flex WNL WNL 4+ 4+     ext WNL WNL 4+ 4+     Thumb opp WNL WNL 4+ 4+     ext WNL WNL 4+ 4+     abd  WNL WNL 4 4+     add WNL WNL 4+ 4+                    AYXUSD Treatment: 2022  Visit #8     Exercise Reps/ Time Weight/ Level Comments   UBE 10 min           Radial wrist, thumb stretch 10x10s       Stretch wrist flex, ext 10x10s       Thumb ext stretch 10x10s       Thumb abd stretch 10x10s       Thumb opp stretch 10x10s       Wrist maze 5x     Opposition 3x10  Red [place] board   Resisted thumb abduction and adduction 3x10 ea  Yellow rubber band x2    Resisted thumb flexion and ext 3x10 ea  Yellow rubber band x2    Foam thumb press  3x10 Black clip    Wrist flexion, ext 3x10 5# Extension to neutral    Wrist rad dev 3x10 5#    Wrist pronation/supination 3x10 5#    Pronator  3x10 3#     squeezes  3x10 Light blue    Wrist roll with weight 5x          Specific Instructions for next treatment:        Treatment Charges: Mins Units   []  Modalities: CP     [x]  Ther Exercise 35 2   []  Manual Therapy     []  Ther Activities     []  Aquatics     []  Vasocompression     []  Other: IP     Total Treatment time 35 2       Assessment: [x] Progressing toward goals. [] No change. [x] Other:  Pt has improved symptoms, w/ less wrist/thumb pain overall. Pt has improved ROM, strength but cont weakness of thumb abd. Pt recommended to cont strengthening of thumb. Pt given instructions for HEP, able to complete exercises independently. [x] Patient would continue to benefit from skilled physical therapy services in order to: restore ROM and progress L hand/wrist strength for improved function. SHORT TERM GOALS ( 8 visits)  Wrist, thumb pain = 0  Wrist, thumb ROM = WNL  Wrist, thumb strength = 4+/5  Wrist, thumb function: , lift, twist w/o pain     LONG TERM GOALS ( 12 visits)  Independent Home Exercise program  Return to normal activity     PATIENT GOAL  Full motion, no pain    Pt. Education:  [x] Yes  [] No  [x] Reviewed Prior HEP/Ed  Method of Education: [x] Verbal  [] Demo  [] Written  Comprehension of Education:  [x] Verbalizes understanding. [x] Demonstrates understanding. [] Needs review.   [] Demonstrates/verbalizes HEP/Ed previously given. Plan: [] Continue current frequency toward long and short term goals.     [x] D/C PT to HEP      Time In: 5:00 pm           Time Out: 6:02 pm    Electronically signed by:  Onesimo Monroy PT

## 2023-07-05 ENCOUNTER — HOSPITAL ENCOUNTER (EMERGENCY)
Age: 33
Discharge: HOME | End: 2023-07-05
Attending: EMERGENCY MEDICINE
Payer: COMMERCIAL

## 2023-07-05 ENCOUNTER — APPOINTMENT (OUTPATIENT)
Dept: GENERAL RADIOLOGY | Age: 33
End: 2023-07-05
Payer: COMMERCIAL

## 2023-07-05 VITALS
OXYGEN SATURATION: 99 % | DIASTOLIC BLOOD PRESSURE: 82 MMHG | WEIGHT: 293 LBS | HEIGHT: 64 IN | SYSTOLIC BLOOD PRESSURE: 136 MMHG | TEMPERATURE: 98.5 F | RESPIRATION RATE: 21 BRPM | BODY MASS INDEX: 50.02 KG/M2 | HEART RATE: 93 BPM

## 2023-07-05 DIAGNOSIS — R00.2 PALPITATIONS: Primary | ICD-10-CM

## 2023-07-05 LAB
ANION GAP SERPL CALCULATED.3IONS-SCNC: 12 MMOL/L (ref 9–17)
BASOPHILS # BLD: 0 K/UL (ref 0–0.2)
BASOPHILS NFR BLD: 0 % (ref 0–2)
BNP SERPL-MCNC: <36 PG/ML
BUN SERPL-MCNC: 10 MG/DL (ref 6–20)
CALCIUM SERPL-MCNC: 9.3 MG/DL (ref 8.6–10.4)
CHLORIDE SERPL-SCNC: 100 MMOL/L (ref 98–107)
CO2 SERPL-SCNC: 25 MMOL/L (ref 20–31)
CREAT SERPL-MCNC: 0.63 MG/DL (ref 0.5–0.9)
D DIMER PPP FEU-MCNC: <0.19 UG/ML FEU
EOSINOPHIL # BLD: 0.1 K/UL (ref 0–0.4)
EOSINOPHILS RELATIVE PERCENT: 1 % (ref 1–4)
ERYTHROCYTE [DISTWIDTH] IN BLOOD BY AUTOMATED COUNT: 13.5 % (ref 12.5–15.4)
GFR SERPL CREATININE-BSD FRML MDRD: >60 ML/MIN/1.73M2
GLUCOSE SERPL-MCNC: 98 MG/DL (ref 70–99)
HCG SERPL QL: NEGATIVE
HCT VFR BLD AUTO: 39.6 % (ref 36–46)
HGB BLD-MCNC: 13.3 G/DL (ref 12–16)
LYMPHOCYTES # BLD: 40 % (ref 24–44)
LYMPHOCYTES NFR BLD: 2.8 K/UL (ref 1–4.8)
MAGNESIUM SERPL-MCNC: 2 MG/DL (ref 1.6–2.6)
MCH RBC QN AUTO: 27.5 PG (ref 26–34)
MCHC RBC AUTO-ENTMCNC: 33.4 G/DL (ref 31–37)
MCV RBC AUTO: 82.1 FL (ref 80–100)
MONOCYTES NFR BLD: 0.3 K/UL (ref 0.1–1.2)
MONOCYTES NFR BLD: 4 % (ref 2–11)
NEUTROPHILS NFR BLD: 55 % (ref 36–66)
NEUTS SEG NFR BLD: 3.8 K/UL (ref 1.8–7.7)
PLATELET # BLD AUTO: 221 K/UL (ref 140–450)
PMV BLD AUTO: 9.9 FL (ref 6–12)
POTASSIUM SERPL-SCNC: 3.8 MMOL/L (ref 3.7–5.3)
RBC # BLD AUTO: 4.83 M/UL (ref 4–5.2)
SODIUM SERPL-SCNC: 137 MMOL/L (ref 135–144)
TROPONIN I SERPL HS-MCNC: <6 NG/L (ref 0–14)
TSH SERPL DL<=0.05 MIU/L-ACNC: 4.13 UIU/ML (ref 0.3–5)
WBC OTHER # BLD: 7 K/UL (ref 3.5–11)

## 2023-07-05 PROCEDURE — 93005 ELECTROCARDIOGRAM TRACING: CPT | Performed by: EMERGENCY MEDICINE

## 2023-07-05 PROCEDURE — 80048 BASIC METABOLIC PNL TOTAL CA: CPT

## 2023-07-05 PROCEDURE — 36415 COLL VENOUS BLD VENIPUNCTURE: CPT

## 2023-07-05 PROCEDURE — 71045 X-RAY EXAM CHEST 1 VIEW: CPT

## 2023-07-05 PROCEDURE — 84484 ASSAY OF TROPONIN QUANT: CPT

## 2023-07-05 PROCEDURE — 83735 ASSAY OF MAGNESIUM: CPT

## 2023-07-05 PROCEDURE — 85027 COMPLETE CBC AUTOMATED: CPT

## 2023-07-05 PROCEDURE — 85379 FIBRIN DEGRADATION QUANT: CPT

## 2023-07-05 PROCEDURE — 84703 CHORIONIC GONADOTROPIN ASSAY: CPT

## 2023-07-05 PROCEDURE — 99285 EMERGENCY DEPT VISIT HI MDM: CPT

## 2023-07-05 PROCEDURE — 84443 ASSAY THYROID STIM HORMONE: CPT

## 2023-07-05 PROCEDURE — 83880 ASSAY OF NATRIURETIC PEPTIDE: CPT

## 2023-07-05 ASSESSMENT — ENCOUNTER SYMPTOMS
NAUSEA: 0
VOMITING: 0
SHORTNESS OF BREATH: 1
BACK PAIN: 0

## 2023-07-05 ASSESSMENT — PAIN SCALES - GENERAL: PAINLEVEL_OUTOF10: 2

## 2023-07-05 ASSESSMENT — PAIN - FUNCTIONAL ASSESSMENT: PAIN_FUNCTIONAL_ASSESSMENT: 0-10

## 2023-07-05 ASSESSMENT — PAIN DESCRIPTION - LOCATION: LOCATION: CHEST

## 2023-07-06 NOTE — ED PROVIDER NOTES
12 [facility] [place] Emergency Department  [id number] [address] [facility] [address]  Phone: [phone]  Fax: [phone]       Pt Name: Claudine Garcia  MRN: [id number]  [address] [facility] [place] 1990  Date of evaluation: 7/5/23  PCP:  Yakov Kaye MD    CHIEF COMPLAINT       Chief Complaint   Patient presents with    Palpitations     Pt reports feeling intermit palpitations for the past 1-2 months, worse today,     Chest Pain     Reports chest discomfort at times, currently 2/10, denies SOB        HISTORY OF PRESENT ILLNESS  (Location/Symptom, Timing/Onset, Context/Setting, Quality, Duration, Modifying Factors, Severity.)    Claudine Garcia is a 33 y.o. female who presents with intermittent episodes of palpitations associated with shortness of breath and chest tightness has been going on for the past 1 to 2 months but then more persistent today. She does drink caffeine, however does not believe excessively and only has coffee. No known history of thyroid disorders. She denies any prior cardiac history. No tobacco use. No recent travel or recent surgery. No history of DVT or PE. No active cancer. Does have a Nexplanon hormone releasing contraceptive implant which is up-to-date. PAST MEDICAL / SURGICAL / SOCIAL / FAMILY HISTORY    has a past medical history of H/O chronic ear infection. has a past surgical history that includes Tonsillectomy and adenoidectomy and Tympanostomy tube placement.     Social History     Socioeconomic History    Marital status:      Spouse name: Not on file    Number of children: Not on file    Years of education: Not on file    Highest education level: Not on file   Occupational History    Not on file   Tobacco Use    Smoking status: Never    Smokeless tobacco: Never   Substance and Sexual Activity    Alcohol use: Yes     Comment: social    Drug use: No    Sexual activity: Not on file   Other Topics Concern    Not on file   Social History Narrative    Not

## 2023-07-07 LAB
EKG ATRIAL RATE: 111 BPM
EKG P AXIS: 27 DEGREES
EKG P-R INTERVAL: 160 MS
EKG Q-T INTERVAL: 326 MS
EKG QRS DURATION: 82 MS
EKG QTC CALCULATION (BAZETT): 443 MS
EKG R AXIS: -3 DEGREES
EKG T AXIS: 11 DEGREES
EKG VENTRICULAR RATE: 111 BPM

## 2023-07-24 ENCOUNTER — APPOINTMENT (OUTPATIENT)
Dept: GENERAL RADIOLOGY | Age: 33
End: 2023-07-24
Payer: COMMERCIAL

## 2023-07-24 ENCOUNTER — HOSPITAL ENCOUNTER (EMERGENCY)
Age: 33
Discharge: HOME | End: 2023-07-24
Attending: EMERGENCY MEDICINE
Payer: COMMERCIAL

## 2023-07-24 VITALS
SYSTOLIC BLOOD PRESSURE: 124 MMHG | WEIGHT: 293 LBS | HEART RATE: 88 BPM | BODY MASS INDEX: 54.58 KG/M2 | DIASTOLIC BLOOD PRESSURE: 81 MMHG | RESPIRATION RATE: 18 BRPM | OXYGEN SATURATION: 100 % | TEMPERATURE: 97.7 F

## 2023-07-24 DIAGNOSIS — R07.9 CHEST PAIN, UNSPECIFIED TYPE: Primary | ICD-10-CM

## 2023-07-24 LAB
ALBUMIN SERPL-MCNC: 4.4 G/DL (ref 3.5–5.2)
ALBUMIN/GLOB SERPL: 1.3 {RATIO} (ref 1–2.5)
ALP SERPL-CCNC: 81 U/L (ref 35–104)
ALT SERPL-CCNC: 23 U/L (ref 5–33)
ANION GAP SERPL CALCULATED.3IONS-SCNC: 10 MMOL/L (ref 9–17)
AST SERPL-CCNC: 20 U/L
BASOPHILS # BLD: 0 K/UL (ref 0–0.2)
BASOPHILS NFR BLD: 1 % (ref 0–2)
BILIRUB SERPL-MCNC: 0.3 MG/DL (ref 0.3–1.2)
BUN SERPL-MCNC: 11 MG/DL (ref 6–20)
CALCIUM SERPL-MCNC: 9.3 MG/DL (ref 8.6–10.4)
CHLORIDE SERPL-SCNC: 102 MMOL/L (ref 98–107)
CO2 SERPL-SCNC: 26 MMOL/L (ref 20–31)
CREAT SERPL-MCNC: 0.6 MG/DL (ref 0.5–0.9)
EOSINOPHIL # BLD: 0.1 K/UL (ref 0–0.4)
EOSINOPHILS RELATIVE PERCENT: 1 % (ref 1–4)
ERYTHROCYTE [DISTWIDTH] IN BLOOD BY AUTOMATED COUNT: 13.6 % (ref 12.5–15.4)
GFR SERPL CREATININE-BSD FRML MDRD: >60 ML/MIN/1.73M2
GLUCOSE SERPL-MCNC: 98 MG/DL (ref 70–99)
HCT VFR BLD AUTO: 38.8 % (ref 36–46)
HGB BLD-MCNC: 13.1 G/DL (ref 12–16)
LIPASE SERPL-CCNC: 18 U/L (ref 13–60)
LYMPHOCYTES NFR BLD: 3.2 K/UL (ref 1–4.8)
LYMPHOCYTES RELATIVE PERCENT: 45 % (ref 24–44)
MCH RBC QN AUTO: 27.6 PG (ref 26–34)
MCHC RBC AUTO-ENTMCNC: 33.6 G/DL (ref 31–37)
MCV RBC AUTO: 82 FL (ref 80–100)
MONOCYTES NFR BLD: 0.4 K/UL (ref 0.1–1.2)
MONOCYTES NFR BLD: 5 % (ref 2–11)
NEUTROPHILS NFR BLD: 48 % (ref 36–66)
NEUTS SEG NFR BLD: 3.4 K/UL (ref 1.8–7.7)
PLATELET # BLD AUTO: 225 K/UL (ref 140–450)
PMV BLD AUTO: 9.3 FL (ref 6–12)
POTASSIUM SERPL-SCNC: 3.8 MMOL/L (ref 3.7–5.3)
PROT SERPL-MCNC: 7.7 G/DL (ref 6.4–8.3)
RBC # BLD AUTO: 4.74 M/UL (ref 4–5.2)
SODIUM SERPL-SCNC: 138 MMOL/L (ref 135–144)
TROPONIN I SERPL HS-MCNC: <6 NG/L (ref 0–14)
WBC OTHER # BLD: 7.1 K/UL (ref 3.5–11)

## 2023-07-24 PROCEDURE — 93005 ELECTROCARDIOGRAM TRACING: CPT | Performed by: REGISTERED NURSE

## 2023-07-24 PROCEDURE — 6370000000 HC RX 637 (ALT 250 FOR IP): Performed by: REGISTERED NURSE

## 2023-07-24 PROCEDURE — 2580000003 HC RX 258: Performed by: REGISTERED NURSE

## 2023-07-24 PROCEDURE — 80053 COMPREHEN METABOLIC PANEL: CPT

## 2023-07-24 PROCEDURE — 99285 EMERGENCY DEPT VISIT HI MDM: CPT

## 2023-07-24 PROCEDURE — 85027 COMPLETE CBC AUTOMATED: CPT

## 2023-07-24 PROCEDURE — 84484 ASSAY OF TROPONIN QUANT: CPT

## 2023-07-24 PROCEDURE — 83690 ASSAY OF LIPASE: CPT

## 2023-07-24 PROCEDURE — 71045 X-RAY EXAM CHEST 1 VIEW: CPT

## 2023-07-24 PROCEDURE — A4216 STERILE WATER/SALINE, 10 ML: HCPCS | Performed by: REGISTERED NURSE

## 2023-07-24 PROCEDURE — 2500000003 HC RX 250 WO HCPCS: Performed by: REGISTERED NURSE

## 2023-07-24 RX ORDER — LIDOCAINE HYDROCHLORIDE 20 MG/ML
15 SOLUTION OROPHARYNGEAL ONCE
Status: DISCONTINUED | OUTPATIENT
Start: 2023-07-24 | End: 2023-07-24

## 2023-07-24 RX ORDER — FAMOTIDINE 20 MG/1
20 TABLET, FILM COATED ORAL 2 TIMES DAILY
Qty: 20 TABLET | Refills: 0 | Status: SHIPPED | OUTPATIENT
Start: 2023-07-24 | End: 2023-08-03

## 2023-07-24 RX ORDER — LIDOCAINE HYDROCHLORIDE 20 MG/ML
15 SOLUTION OROPHARYNGEAL
Status: DISCONTINUED | OUTPATIENT
Start: 2023-07-24 | End: 2023-07-24

## 2023-07-24 RX ORDER — LIDOCAINE HYDROCHLORIDE 20 MG/ML
15 SOLUTION OROPHARYNGEAL ONCE
Status: COMPLETED | OUTPATIENT
Start: 2023-07-24 | End: 2023-07-24

## 2023-07-24 RX ORDER — MAGNESIUM HYDROXIDE/ALUMINUM HYDROXICE/SIMETHICONE 120; 1200; 1200 MG/30ML; MG/30ML; MG/30ML
30 SUSPENSION ORAL ONCE
Status: COMPLETED | OUTPATIENT
Start: 2023-07-24 | End: 2023-07-24

## 2023-07-24 RX ADMIN — FAMOTIDINE 20 MG: 10 INJECTION INTRAVENOUS at 21:42

## 2023-07-24 RX ADMIN — ALUMINUM HYDROXIDE, MAGNESIUM HYDROXIDE, AND SIMETHICONE 30 ML: 200; 200; 20 SUSPENSION ORAL at 22:03

## 2023-07-24 RX ADMIN — LIDOCAINE HYDROCHLORIDE 15 ML: 20 SOLUTION ORAL at 22:03

## 2023-07-24 RX ADMIN — LIDOCAINE HYDROCHLORIDE 15 ML: 20 SOLUTION ORAL; TOPICAL at 22:04

## 2023-07-24 ASSESSMENT — PAIN DESCRIPTION - LOCATION: LOCATION: CHEST

## 2023-07-24 ASSESSMENT — PAIN - FUNCTIONAL ASSESSMENT: PAIN_FUNCTIONAL_ASSESSMENT: 0-10

## 2023-07-24 ASSESSMENT — ENCOUNTER SYMPTOMS
NAUSEA: 0
ABDOMINAL PAIN: 0
SHORTNESS OF BREATH: 0
VOMITING: 0
BACK PAIN: 0
DIARRHEA: 0
COUGH: 0

## 2023-07-24 ASSESSMENT — PAIN DESCRIPTION - DESCRIPTORS: DESCRIPTORS: SHARP;PRESSURE

## 2023-07-24 ASSESSMENT — PAIN DESCRIPTION - ORIENTATION: ORIENTATION: LEFT

## 2023-07-24 ASSESSMENT — PAIN DESCRIPTION - PAIN TYPE: TYPE: ACUTE PAIN

## 2023-07-24 ASSESSMENT — PAIN SCALES - GENERAL
PAINLEVEL_OUTOF10: 2
PAINLEVEL_OUTOF10: 5

## 2023-07-24 ASSESSMENT — PAIN DESCRIPTION - FREQUENCY: FREQUENCY: INTERMITTENT

## 2023-07-25 LAB
EKG ATRIAL RATE: 103 BPM
EKG P AXIS: 33 DEGREES
EKG P-R INTERVAL: 182 MS
EKG Q-T INTERVAL: 356 MS
EKG QRS DURATION: 80 MS
EKG QTC CALCULATION (BAZETT): 466 MS
EKG R AXIS: -8 DEGREES
EKG T AXIS: 7 DEGREES
EKG VENTRICULAR RATE: 103 BPM

## 2023-07-25 NOTE — DISCHARGE INSTRUCTIONS
PLEASE RETURN TO THE EMERGENCY DEPARTMENT IMMEDIATELY if your symptoms worsen in anyway or in 8-12 hours if not improved for re-evaluation. You should immediately return to the ER for symptoms such as increasing pain, shortness of breath, fever, a feeling of passing out, light headed, dizziness, abdominal pain, numbness or weakness to the arms or legs, coolness or color change of the arms or legs. Take your medication as indicated and prescribed. If you are given an antibiotic then, make sure you get the prescription filled and take the antibiotics until finished. Please understand that at this time there is no evidence for a more serious underlying process, but that early in the process of an illness or injury, an emergency department workup can be falsely reassuring. You should contact your family doctor within the next 24 hours for a follow up appointment    [facility]!!!    From [facility] ([facility]) and [facility] Emergency Services    On behalf of the Emergency Department staff at [facility]), I would like to thank you for giving us the opportunity to address your health care needs and concerns. We hope that during your visit, our service was delivered in a professional and caring manner. Please keep [facility] ([facility]) in mind as we walk with you down the path to your own personal wellness. Please expect an automated text message or email from us so we can ask a few questions about your health and progress. Based on your answers, a clinician may call you back to offer help and instructions. Please understand that early in the process of an illness or injury, an emergency department workup can be falsely reassuring. If you notice any worsening, changing or persistent symptoms please call your family doctor or return to the ER immediately. Tell us how we did during your visit at [email or web address]   and let us know about your experience

## 2023-07-25 NOTE — ED PROVIDER NOTES
12 [facility] Emergency Department  [address] [address] [facility] [address]  Phone: [phone]  Fax: [phone]      Attending Physician Attestation    I performed a history and physical examination of the patient and discussed management with the mid level provider. I reviewed the mid level provider's note and agree with the documented findings and plan of care. Any areas of disagreement are noted on the chart. I was personally present for the key portions of any procedures. I have documented in the chart those procedures where I was not present during the key portions. I have reviewed the emergency nurses triage note. I agree with the chief complaint, past medical history, past surgical history, allergies, medications, social and family history as documented unless otherwise noted below. Documentation of the HPI, Physical Exam and Medical Decision Making performed by mid level providers is based on my personal performance of the HPI, PE and MDM. For Physician Assistant/ Nurse Practitioner cases/documentation I have personally evaluated this patient and have completed at least one if not all key elements of the E/M (history, physical exam, and MDM). Additional findings are as noted. CHIEF COMPLAINT       Chief Complaint   Patient presents with    Chest Pain     Midsternal chest pain radiating to left chest and shoulder and through to back; Seen in ER 2 weeks ago for heart palpitations w/ neg findings denies SOB, nausea or diaphoresis. PAST MEDICAL HISTORY     Past Medical History:   Diagnosis Date    H/O chronic ear infection        SURGICAL HISTORY      has a past surgical history that includes Tonsillectomy and adenoidectomy and Tympanostomy tube placement.     CURRENT MEDICATIONS       Previous Medications    DICLOFENAC SODIUM (VOLTAREN) 1 % GEL    Apply 2 g topically 4 times daily    ETONOGESTREL (NEXPLANON) 68 MG IMPLANT    68 mg by Subdermal route once    MINOCYCLINE
famotidine (PEPCID) 20 MG tablet     Sig: Take 1 tablet by mouth 2 times daily for 20 doses     Dispense:  20 tablet     Refill:  0       Controlled Substances Monitoring:     DIAGNOSTIC RESULTS     EKG: All EKG's are interpreted by the Emergency Department Physician who either signs or Co-signs this chart in the absenceof a cardiologist.        RADIOLOGY: All images are read by the radiologist and their interpretations are reviewed. XR CHEST PORTABLE   Preliminary Result   Stable portable chest x-ray without any definable acute cardiopulmonary   abnormality. Clear lungs bilaterally.                  LABS:  Results for orders placed or performed during the hospital encounter of 07/24/23   CBC with Auto Differential   Result Value Ref Range    WBC 7.1 3.5 - 11.0 k/uL    RBC 4.74 4.0 - 5.2 m/uL    Hemoglobin 13.1 12.0 - 16.0 g/dL    Hematocrit 38.8 36 - 46 %    MCV 82.0 80 - 100 fL    MCH 27.6 26 - 34 pg    MCHC 33.6 31 - 37 g/dL    RDW 13.6 12.5 - 15.4 %    Platelets 520 150 - 785 k/uL    MPV 9.3 6.0 - 12.0 fL    Neutrophils % 48 36 - 66 %    Lymphocytes % 45 (H) 24 - 44 %    Monocytes % 5 2 - 11 %    Eosinophils % 1 1 - 4 %    Basophils % 1 0 - 2 %    Neutrophils Absolute 3.40 1.8 - 7.7 k/uL    Lymphocytes Absolute 3.20 1.0 - 4.8 k/uL    Monocytes Absolute 0.40 0.1 - 1.2 k/uL    Eosinophils Absolute 0.10 0.0 - 0.4 k/uL    Basophils Absolute 0.00 0.0 - 0.2 k/uL   Comprehensive Metabolic Panel   Result Value Ref Range    Glucose 98 70 - 99 mg/dL    BUN 11 6 - 20 mg/dL    Creatinine 0.6 0.5 - 0.9 mg/dL    Est, Glom Filt Rate >60 >60 mL/min/1.73m2    Calcium 9.3 8.6 - 10.4 mg/dL    Sodium 138 135 - 144 mmol/L    Potassium 3.8 3.7 - 5.3 mmol/L    Chloride 102 98 - 107 mmol/L    CO2 26 20 - 31 mmol/L    Anion Gap 10 9 - 17 mmol/L    Alkaline Phosphatase 81 35 - 104 U/L    ALT 23 5 - 33 U/L    AST 20 <32 U/L    Total Bilirubin 0.3 0.3 - 1.2 mg/dL    Total Protein 7.7 6.4 - 8.3 g/dL    Albumin 4.4 3.5 - 5.2 g/dL

## 2023-08-02 ENCOUNTER — HOSPITAL ENCOUNTER (OUTPATIENT)
Age: 33
Setting detail: SPECIMEN
Discharge: HOME | End: 2023-08-02

## 2023-08-02 ENCOUNTER — OFFICE VISIT (OUTPATIENT)
Dept: PRIMARY CARE CLINIC | Age: 33
End: 2023-08-02
Payer: COMMERCIAL

## 2023-08-02 VITALS
HEART RATE: 98 BPM | DIASTOLIC BLOOD PRESSURE: 80 MMHG | WEIGHT: 293 LBS | BODY MASS INDEX: 50.02 KG/M2 | SYSTOLIC BLOOD PRESSURE: 120 MMHG | HEIGHT: 64 IN | OXYGEN SATURATION: 99 %

## 2023-08-02 DIAGNOSIS — K21.9 GASTROESOPHAGEAL REFLUX DISEASE WITHOUT ESOPHAGITIS: ICD-10-CM

## 2023-08-02 DIAGNOSIS — R30.0 DYSURIA: ICD-10-CM

## 2023-08-02 DIAGNOSIS — R00.2 PALPITATION: ICD-10-CM

## 2023-08-02 DIAGNOSIS — F41.9 ANXIETY: ICD-10-CM

## 2023-08-02 LAB
BILIRUBIN, POC: NEGATIVE
BLOOD URINE, POC: NEGATIVE
CLARITY, POC: ABNORMAL
COLOR, POC: YELLOW
GLUCOSE URINE, POC: NEGATIVE
KETONES, POC: NEGATIVE
LEUKOCYTE EST, POC: ABNORMAL
NITRITE, POC: NEGATIVE
PH, POC: 8.5
PROTEIN, POC: NEGATIVE
SPECIFIC GRAVITY, POC: 1.02
UROBILINOGEN, POC: ABNORMAL

## 2023-08-02 PROCEDURE — 99214 OFFICE O/P EST MOD 30 MIN: CPT | Performed by: PHYSICIAN ASSISTANT

## 2023-08-02 PROCEDURE — 81003 URINALYSIS AUTO W/O SCOPE: CPT | Performed by: PHYSICIAN ASSISTANT

## 2023-08-02 RX ORDER — ESCITALOPRAM OXALATE 10 MG/1
10 TABLET ORAL DAILY
Qty: 30 TABLET | Refills: 0 | Status: SHIPPED | OUTPATIENT
Start: 2023-08-02

## 2023-08-02 SDOH — ECONOMIC STABILITY: INCOME INSECURITY: HOW HARD IS IT FOR YOU TO PAY FOR THE VERY BASICS LIKE FOOD, HOUSING, MEDICAL CARE, AND HEATING?: NOT HARD AT ALL

## 2023-08-02 SDOH — ECONOMIC STABILITY: HOUSING INSECURITY
IN THE LAST 12 MONTHS, WAS THERE A TIME WHEN YOU DID NOT HAVE A STEADY PLACE TO SLEEP OR SLEPT IN A SHELTER (INCLUDING NOW)?: NO

## 2023-08-02 SDOH — ECONOMIC STABILITY: FOOD INSECURITY: WITHIN THE PAST 12 MONTHS, YOU WORRIED THAT YOUR FOOD WOULD RUN OUT BEFORE YOU GOT MONEY TO BUY MORE.: NEVER TRUE

## 2023-08-02 SDOH — ECONOMIC STABILITY: TRANSPORTATION INSECURITY
IN THE PAST 12 MONTHS, HAS LACK OF TRANSPORTATION KEPT YOU FROM MEETINGS, WORK, OR FROM GETTING THINGS NEEDED FOR DAILY LIVING?: NO

## 2023-08-02 SDOH — ECONOMIC STABILITY: FOOD INSECURITY: WITHIN THE PAST 12 MONTHS, THE FOOD YOU BOUGHT JUST DIDN'T LAST AND YOU DIDN'T HAVE MONEY TO GET MORE.: NEVER TRUE

## 2023-08-02 ASSESSMENT — PATIENT HEALTH QUESTIONNAIRE - PHQ9
SUM OF ALL RESPONSES TO PHQ9 QUESTIONS 1 & 2: 0
2. FEELING DOWN, DEPRESSED OR HOPELESS: NOT AT ALL
1. LITTLE INTEREST OR PLEASURE IN DOING THINGS: 0
SUM OF ALL RESPONSES TO PHQ9 QUESTIONS 1 & 2: 0
1. LITTLE INTEREST OR PLEASURE IN DOING THINGS: NOT AT ALL
SUM OF ALL RESPONSES TO PHQ QUESTIONS 1-9: 0
2. FEELING DOWN, DEPRESSED OR HOPELESS: 0
SUM OF ALL RESPONSES TO PHQ QUESTIONS 1-9: 0

## 2023-08-02 ASSESSMENT — ENCOUNTER SYMPTOMS
SHORTNESS OF BREATH: 0
CHEST TIGHTNESS: 0
COUGH: 0
ABDOMINAL DISTENTION: 0
DIARRHEA: 0
SINUS PRESSURE: 0
CONSTIPATION: 0
VOMITING: 0
ABDOMINAL PAIN: 0
SORE THROAT: 0
RHINORRHEA: 0

## 2023-08-02 NOTE — PROGRESS NOTES
[address] PRIMARY CARE  [address] [facility] [address]  Dept: 74 Dominic Raymundo is a 33 y.o. female Established patient, who presents today for her medical conditions/complaints as noted below. Chief Complaint   Patient presents with    Palpitations     Off and on 2 months , last week feeling pain ER Dx as GERD prescribed Famotidine says it helps also says Nexium works as previously used in high school. Other     Also has been having some anxiety, wanted to discuss some possible Meds. HPI:     HPI: The patient is a pleasant 50-year-old female who presents today with concerns of palpitation as well as anxiety. Was having chest pain diagnoses GERD prescribed Pepcid which was helping. Off and on palpitations have been occurring for 2 months. They are improving. Not associated with exertion.      Reviewed prior notes None  Reviewed previous Labs    LDL Calculated (mg/dL)   Date Value   03/15/2018 92       (goal LDL is <100)   AST (U/L)   Date Value   07/24/2023 20     ALT (U/L)   Date Value   07/24/2023 23     BUN (mg/dL)   Date Value   07/24/2023 11     TSH (uIU/mL)   Date Value   07/05/2023 4.13     BP Readings from Last 3 Encounters:   08/02/23 120/80   07/24/23 124/81   07/05/23 136/82          (goal 120/80)  No results found for: LABA1C  Past Medical History:   Diagnosis Date    H/O chronic ear infection       Past Surgical History:   Procedure Laterality Date    TONSILLECTOMY AND ADENOIDECTOMY      TYMPANOSTOMY TUBE PLACEMENT      multiple times       Family History   Problem Relation Age of Onset    Cancer Mother         lung-smoker    Heart Disease Father     Diabetes Maternal Grandmother     Cancer Maternal Grandfather     Cancer Paternal Grandfather        Social History     Tobacco Use    Smoking status: Never    Smokeless tobacco: Never   Substance Use Topics    Alcohol use: Yes     Comment: social      Current Outpatient

## 2023-08-04 RX ORDER — NITROFURANTOIN 25; 75 MG/1; MG/1
100 CAPSULE ORAL 2 TIMES DAILY
Qty: 10 CAPSULE | Refills: 0 | Status: SHIPPED | OUTPATIENT
Start: 2023-08-04 | End: 2023-08-09

## 2023-08-04 NOTE — RESULT ENCOUNTER NOTE
Call and advise patient that the results showed possible UTI I will send in [facility] for patient to begin.

## 2023-08-06 LAB
MICROORGANISM SPEC CULT: ABNORMAL
SPECIMEN DESCRIPTION: ABNORMAL

## 2023-08-28 DIAGNOSIS — F41.9 ANXIETY: ICD-10-CM

## 2023-08-28 RX ORDER — ESCITALOPRAM OXALATE 10 MG/1
10 TABLET ORAL DAILY
Qty: 30 TABLET | Refills: 3 | Status: SHIPPED | OUTPATIENT
Start: 2023-08-28

## 2023-08-28 NOTE — TELEPHONE ENCOUNTER
LAST VISIT:   8/2/2023     Future Appointments   Date Time Provider 4600  [address]   9/13/2023  3:40 PM MD ELANA Duque

## 2023-09-13 ENCOUNTER — OFFICE VISIT (OUTPATIENT)
Dept: PRIMARY CARE CLINIC | Age: 33
End: 2023-09-13
Payer: COMMERCIAL

## 2023-09-13 VITALS
OXYGEN SATURATION: 98 % | DIASTOLIC BLOOD PRESSURE: 70 MMHG | WEIGHT: 293 LBS | SYSTOLIC BLOOD PRESSURE: 130 MMHG | BODY MASS INDEX: 50.02 KG/M2 | HEIGHT: 64 IN | HEART RATE: 92 BPM

## 2023-09-13 DIAGNOSIS — Z23 IMMUNIZATION DUE: ICD-10-CM

## 2023-09-13 DIAGNOSIS — F41.9 ANXIETY: ICD-10-CM

## 2023-09-13 DIAGNOSIS — K21.9 GASTROESOPHAGEAL REFLUX DISEASE WITHOUT ESOPHAGITIS: ICD-10-CM

## 2023-09-13 DIAGNOSIS — Z79.899 MEDICATION MANAGEMENT: Primary | ICD-10-CM

## 2023-09-13 PROCEDURE — 90715 TDAP VACCINE 7 YRS/> IM: CPT | Performed by: FAMILY MEDICINE

## 2023-09-13 PROCEDURE — 99213 OFFICE O/P EST LOW 20 MIN: CPT | Performed by: FAMILY MEDICINE

## 2023-09-13 PROCEDURE — 90471 IMMUNIZATION ADMIN: CPT | Performed by: FAMILY MEDICINE

## 2023-09-13 NOTE — PROGRESS NOTES
[address] PRIMARY CARE  [address] [address]  [address] [facility] [address]  Dept: 74 Dominic Raymundo is a 29 y.o. female Established patient, who presents today for her medical conditions/complaintsas noted below. Chief Complaint   Patient presents with    Anxiety     Pt is here for a x6 week f/u.         HPI:     HPI  Anxiety is under much better control  Energy level is better  GERD and palpitations much better    Reviewed prior notes None  Reviewed previous  none    Sees gyn at [facility]  LDL Calculated (mg/dL)   Date Value   03/15/2018 92       (goal LDL is <100)   AST (U/L)   Date Value   07/24/2023 20     ALT (U/L)   Date Value   07/24/2023 23     BUN (mg/dL)   Date Value   07/24/2023 11     TSH (uIU/mL)   Date Value   07/05/2023 4.13     BP Readings from Last 3 Encounters:   09/13/23 130/70   08/02/23 120/80   07/24/23 124/81          (goal 120/80)    Past Medical History:   Diagnosis Date    H/O chronic ear infection       Past Surgical History:   Procedure Laterality Date    TONSILLECTOMY AND ADENOIDECTOMY      TYMPANOSTOMY TUBE PLACEMENT      multiple times       Family History   Problem Relation Age of Onset    Cancer Mother         lung-smoker    Heart Disease Father     Diabetes Maternal Grandmother     Cancer Maternal Grandfather     Cancer Paternal Grandfather        Social History     Tobacco Use    Smoking status: Never    Smokeless tobacco: Never   Substance Use Topics    Alcohol use: Yes     Comment: social      Current Outpatient Medications   Medication Sig Dispense Refill    escitalopram (LEXAPRO) 10 MG tablet TAKE 1 TABLET BY MOUTH DAILY 30 tablet 3    esomeprazole (NEXIUM) 20 MG delayed release capsule Take 1 capsule by mouth every morning (before breakfast) 30 capsule 5    etonogestrel (NEXPLANON) 68 MG implant 68 mg by Subdermal route once      Multiple Vitamins-Minerals (THERAPEUTIC MULTIVITAMIN-MINERALS) tablet Take 1 tablet by mouth daily

## 2023-12-14 ENCOUNTER — OFFICE VISIT (OUTPATIENT)
Dept: PRIMARY CARE CLINIC | Age: 33
End: 2023-12-14
Payer: COMMERCIAL

## 2023-12-14 VITALS
HEIGHT: 64 IN | DIASTOLIC BLOOD PRESSURE: 84 MMHG | HEART RATE: 105 BPM | BODY MASS INDEX: 50.02 KG/M2 | OXYGEN SATURATION: 99 % | SYSTOLIC BLOOD PRESSURE: 140 MMHG | WEIGHT: 293 LBS

## 2023-12-14 DIAGNOSIS — H66.90 ACUTE OTITIS MEDIA, UNSPECIFIED OTITIS MEDIA TYPE: ICD-10-CM

## 2023-12-14 DIAGNOSIS — J02.9 SORE THROAT: ICD-10-CM

## 2023-12-14 LAB
Lab: NORMAL
QC PASS/FAIL: NORMAL
SARS-COV-2 RDRP RESP QL NAA+PROBE: NEGATIVE
STREP PYOGENES DNA, POC: NEGATIVE
VALID INTERNAL CONTROL, POC: NORMAL

## 2023-12-14 PROCEDURE — 87635 SARS-COV-2 COVID-19 AMP PRB: CPT | Performed by: PHYSICIAN ASSISTANT

## 2023-12-14 PROCEDURE — 87651 STREP A DNA AMP PROBE: CPT | Performed by: PHYSICIAN ASSISTANT

## 2023-12-14 PROCEDURE — 99214 OFFICE O/P EST MOD 30 MIN: CPT | Performed by: PHYSICIAN ASSISTANT

## 2023-12-14 RX ORDER — AMOXICILLIN AND CLAVULANATE POTASSIUM 875; 125 MG/1; MG/1
1 TABLET, FILM COATED ORAL 2 TIMES DAILY
Qty: 20 TABLET | Refills: 0 | Status: SHIPPED | OUTPATIENT
Start: 2023-12-14 | End: 2023-12-24

## 2023-12-14 RX ORDER — FLUCONAZOLE 150 MG/1
150 TABLET ORAL
Qty: 2 TABLET | Refills: 0 | Status: SHIPPED | OUTPATIENT
Start: 2023-12-14 | End: 2023-12-20

## 2024-01-14 DIAGNOSIS — F41.9 ANXIETY: ICD-10-CM

## 2024-01-16 RX ORDER — ESCITALOPRAM OXALATE 10 MG/1
10 TABLET ORAL DAILY
Qty: 30 TABLET | Refills: 3 | Status: SHIPPED | OUTPATIENT
Start: 2024-01-16

## 2024-02-12 DIAGNOSIS — K21.9 GASTROESOPHAGEAL REFLUX DISEASE WITHOUT ESOPHAGITIS: ICD-10-CM

## 2024-02-15 ENCOUNTER — OFFICE VISIT (OUTPATIENT)
Dept: PRIMARY CARE CLINIC | Age: 34
End: 2024-02-15
Payer: COMMERCIAL

## 2024-02-15 ENCOUNTER — TELEPHONE (OUTPATIENT)
Dept: PRIMARY CARE CLINIC | Age: 34
End: 2024-02-15

## 2024-02-15 VITALS
HEART RATE: 91 BPM | OXYGEN SATURATION: 97 % | SYSTOLIC BLOOD PRESSURE: 130 MMHG | HEIGHT: 64 IN | DIASTOLIC BLOOD PRESSURE: 90 MMHG | BODY MASS INDEX: 50.02 KG/M2 | WEIGHT: 293 LBS

## 2024-02-15 DIAGNOSIS — Z79.899 MEDICATION MANAGEMENT: ICD-10-CM

## 2024-02-15 DIAGNOSIS — R03.0 BORDERLINE HYPERTENSION: ICD-10-CM

## 2024-02-15 DIAGNOSIS — Z79.899 MEDICATION MANAGEMENT: Primary | ICD-10-CM

## 2024-02-15 DIAGNOSIS — F41.9 ANXIETY: ICD-10-CM

## 2024-02-15 PROCEDURE — 99214 OFFICE O/P EST MOD 30 MIN: CPT | Performed by: FAMILY MEDICINE

## 2024-02-15 RX ORDER — SEMAGLUTIDE 0.25 MG/.5ML
0.3 INJECTION, SOLUTION SUBCUTANEOUS
Qty: 4 ML | Refills: 0 | Status: SHIPPED | OUTPATIENT
Start: 2024-02-15 | End: 2024-03-16

## 2024-02-15 RX ORDER — ESCITALOPRAM OXALATE 20 MG/1
20 TABLET ORAL DAILY
Qty: 30 TABLET | Refills: 3 | Status: SHIPPED | OUTPATIENT
Start: 2024-02-15 | End: 2024-06-14

## 2024-02-15 NOTE — TELEPHONE ENCOUNTER
Patient states her insurance will cover Mounjaro, patient is asking if this would be okay with you so patient doesn't have to pay anything out of pocket.    If agreed please send to [facility] in [place].

## 2024-02-15 NOTE — TELEPHONE ENCOUNTER
[facility]  [facility] however [facility] may only cover it if she is a diabetic.

## 2024-02-16 NOTE — TELEPHONE ENCOUNTER
Canceled rx at [facility]. KEILY. Also let pt know that her message was addressed.

## 2024-02-22 ENCOUNTER — TELEPHONE (OUTPATIENT)
Dept: PRIMARY CARE CLINIC | Age: 34
End: 2024-02-22

## 2024-02-22 RX ORDER — SEMAGLUTIDE 0.25 MG/.5ML
0.3 INJECTION, SOLUTION SUBCUTANEOUS
Qty: 4 ML | Refills: 0 | Status: SHIPPED | OUTPATIENT
Start: 2024-02-22 | End: 2024-03-23

## 2024-02-22 NOTE — TELEPHONE ENCOUNTER
Pt called asking that the prescription for Wegovy go to [facility] pharmacy in [place]. She changed her mind about [facility] since the insurance will not cover it.

## 2024-03-13 RX ORDER — SEMAGLUTIDE 0.25 MG/.5ML
0.3 INJECTION, SOLUTION SUBCUTANEOUS
Qty: 4 ML | Refills: 0 | Status: CANCELLED | OUTPATIENT
Start: 2024-03-13 | End: 2024-04-12

## 2024-03-13 RX ORDER — SEMAGLUTIDE 0.25 MG/.5ML
0.6 INJECTION, SOLUTION SUBCUTANEOUS
Qty: 6 ML | Refills: 0 | Status: SHIPPED | OUTPATIENT
Start: 2024-03-13 | End: 2024-04-12

## 2024-04-15 ENCOUNTER — TELEPHONE (OUTPATIENT)
Dept: PRIMARY CARE CLINIC | Age: 34
End: 2024-04-15

## 2024-04-15 RX ORDER — SEMAGLUTIDE 0.25 MG/.5ML
0.6 INJECTION, SOLUTION SUBCUTANEOUS
Qty: 6 ML | Refills: 0 | Status: SHIPPED | OUTPATIENT
Start: 2024-04-15 | End: 2024-05-15

## 2024-04-15 NOTE — TELEPHONE ENCOUNTER
I would like her to stay on this dose until she comes in for a recheck.   She has an appointment in May but she may want to set one up for July since my schedule fills up quickly.

## 2024-04-15 NOTE — TELEPHONE ENCOUNTER
Pt is on 3rd week of SEMAGLUTIDE-Weight Management (WEGOVY) 0.25MG/0.5ML SOAJ SC    She wasn't sure if you were increasing dose but she will need a refill.

## 2024-04-26 DIAGNOSIS — F41.9 ANXIETY: ICD-10-CM

## 2024-04-26 RX ORDER — ESCITALOPRAM OXALATE 20 MG/1
20 TABLET ORAL DAILY
Qty: 90 TABLET | Refills: 1 | Status: SHIPPED | OUTPATIENT
Start: 2024-04-26 | End: 2024-10-23